# Patient Record
Sex: FEMALE | HISPANIC OR LATINO | Employment: UNEMPLOYED | URBAN - METROPOLITAN AREA
[De-identification: names, ages, dates, MRNs, and addresses within clinical notes are randomized per-mention and may not be internally consistent; named-entity substitution may affect disease eponyms.]

---

## 2022-12-21 ENCOUNTER — OFFICE VISIT (OUTPATIENT)
Dept: OBGYN CLINIC | Facility: CLINIC | Age: 27
End: 2022-12-21

## 2022-12-21 VITALS
HEIGHT: 64 IN | BODY MASS INDEX: 30.22 KG/M2 | DIASTOLIC BLOOD PRESSURE: 68 MMHG | SYSTOLIC BLOOD PRESSURE: 108 MMHG | WEIGHT: 177 LBS | HEART RATE: 84 BPM

## 2022-12-21 DIAGNOSIS — O21.9 NAUSEA AND VOMITING DURING PREGNANCY: ICD-10-CM

## 2022-12-21 DIAGNOSIS — Z32.01 POSITIVE PREGNANCY TEST: Primary | ICD-10-CM

## 2022-12-21 DIAGNOSIS — N91.2 AMENORRHEA: ICD-10-CM

## 2022-12-21 LAB — SL AMB POCT URINE HCG: POSITIVE

## 2022-12-21 RX ORDER — DIPHENHYDRAMINE HYDROCHLORIDE 25 MG/1
25 CAPSULE ORAL 3 TIMES DAILY
Qty: 90 TABLET | Refills: 1 | Status: SHIPPED | OUTPATIENT
Start: 2022-12-21

## 2022-12-21 RX ORDER — GLYCERIN/MINERAL OIL
1 LOTION (ML) TOPICAL DAILY
Qty: 90 TABLET | Refills: 3 | Status: SHIPPED | OUTPATIENT
Start: 2022-12-21

## 2022-12-21 NOTE — PROGRESS NOTES
Assessment/Plan:    No problem-specific Assessment & Plan notes found for this encounter  Diagnoses and all orders for this visit:    Positive pregnancy test  -     POCT urine HCG  -     Prenatal Vit-Fe Fumarate-FA (SM Prenatal Vitamins) 28-0 8 MG TABS; Take 1 tablet by mouth in the morning  Reviewed precautions to call or go to ER with pain, bleeding  Amenorrhea  -     POCT urine HCG    Nausea and vomiting during pregnancy  -     Pyridoxine HCl (vitamin B-6) 25 MG tablet; Take 1 tablet (25 mg total) by mouth 3 (three) times a day  -     doxylamine (UNISOM) 25 MG tablet; Take 1 tablet (25 mg total) by mouth daily at bedtime as needed for sleep or nausea  Reviewed small frequent meals, protein snack prior to bedtime  Depression Screening Follow-up Plan: Patient's depression screening was positive with a PHQ-2 score of 3  Their PHQ-9 score was 12  Patient assessed for underlying major depression  They have no active suicidal ideations  Brief counseling provided and recommend additional follow-up/re-evaluation next office visit  Will refer to social work  Subjective:      Patient ID: Geovanna Leal is a 28 y o  female  HPI 60-year-old new patient here with irregular menses  Cymro Interpretation by José Manuel Melgar  Pt is accompanied by a friend  LMP 10/25/22, menses regular x 4 days  She has a positive UPT today  Pt is happy with result, she has been trying to get pregnant for 8 years with same partner  Has PCOS history  Has nausea, has vomited only x2  She is able to eat  She denies any pelvic pain or bleeding  The following portions of the patient's history were reviewed and updated as appropriate: allergies, current medications, past family history, past medical history, past social history, past surgical history and problem list     Review of Systems   Constitutional: Negative for chills  Respiratory: Negative  Cardiovascular: Negative      Gastrointestinal: Positive for nausea and vomiting  Genitourinary: Negative for pelvic pain, vaginal bleeding and vaginal discharge  Objective:      /68 (BP Location: Left arm, Patient Position: Sitting, Cuff Size: Adult)   Pulse 84   Ht 5' 4" (1 626 m)   Wt 80 3 kg (177 lb)   LMP 10/29/2022 (Approximate)   BMI 30 38 kg/m²          Physical Exam  Constitutional:       Appearance: Normal appearance  Cardiovascular:      Rate and Rhythm: Normal rate  Pulmonary:      Effort: Pulmonary effort is normal    Abdominal:      Palpations: Abdomen is soft  Tenderness: There is abdominal tenderness  Comments: Very mild tenderness midline   Skin:     General: Skin is warm and dry  Neurological:      Mental Status: She is oriented to person, place, and time     Psychiatric:         Mood and Affect: Mood normal          Behavior: Behavior normal

## 2022-12-22 ENCOUNTER — PATIENT OUTREACH (OUTPATIENT)
Dept: OBGYN CLINIC | Facility: CLINIC | Age: 27
End: 2022-12-22

## 2022-12-29 ENCOUNTER — PATIENT OUTREACH (OUTPATIENT)
Dept: OBGYN CLINIC | Facility: CLINIC | Age: 27
End: 2022-12-29

## 2022-12-29 ENCOUNTER — TELEPHONE (OUTPATIENT)
Dept: OBGYN CLINIC | Facility: CLINIC | Age: 27
End: 2022-12-29

## 2022-12-29 NOTE — PROGRESS NOTES
ELOINA BRUCE spoke with 29 y/o- S- G1-  Frisian speaking woman for pre  assessment  Pt and FOB resides with Pt's parents  Pt reported pregnancy was not planned but welcomed  Pt denies any usage of drug, alcohol or smoking  Pt denies any Mental Health or Domestic Violence History  Pt has [facility] and will call [phone] for appointment  Pt works at [employer] and denies any financial concerns  Pt denies transportation issues  Pt claimed FOB and her family are supportive  Pt main concern is a letter for work  Per Pt she called the clinic on  requesting appointment to be seen due to n/v and feeling sick  Pt stated she was advised to make appointment at PCP due to the fever  Pt call [facility] and they told her there was no appointment for new Pt until February  Pt call ed back the Ob clinic and was advised to the ED but Pt declined because she do not have medical insurance  Pt was offered appointment for Friday and she took it  Pt has been out of work since Tuesday and need a work note  Pt was advice to discuss that with provider tomorrow but since she did not went to the ED it may be difficult to gave her note without been seen  Pt verbalized understanding  ELOINA BRUCE explained her role and gave contact information  Pt was encouraged to call at any time needed

## 2022-12-29 NOTE — TELEPHONE ENCOUNTER
Pt called saying she had nausea, vomiting and fever  Pt was [facility]  Pt stated she called and they stated they would not be able to see her  Pt was advised she can go to ED for symptoms and to take medication that was prescribed  Pt stated due to insurance she would not be able to go to ED  Pt was advised she can apply for [facility] care  Pt stated she will wait till Friday to be seen and was scheduled

## 2022-12-30 ENCOUNTER — OFFICE VISIT (OUTPATIENT)
Dept: OBGYN CLINIC | Facility: CLINIC | Age: 27
End: 2022-12-30

## 2022-12-30 VITALS
HEART RATE: 97 BPM | SYSTOLIC BLOOD PRESSURE: 103 MMHG | WEIGHT: 172 LBS | TEMPERATURE: 98.8 F | DIASTOLIC BLOOD PRESSURE: 72 MMHG | HEIGHT: 64 IN | BODY MASS INDEX: 29.37 KG/M2

## 2022-12-30 DIAGNOSIS — O21.9 NAUSEA AND VOMITING DURING PREGNANCY: ICD-10-CM

## 2022-12-30 DIAGNOSIS — R10.9 FLANK PAIN: ICD-10-CM

## 2022-12-30 DIAGNOSIS — O23.11 ACUTE CYSTITIS DURING PREGNANCY IN FIRST TRIMESTER: Primary | ICD-10-CM

## 2022-12-30 LAB
SL AMB  POCT GLUCOSE, UA: ABNORMAL
SL AMB LEUKOCYTE ESTERASE,UA: ABNORMAL
SL AMB POCT BILIRUBIN,UA: ABNORMAL
SL AMB POCT BLOOD,UA: ABNORMAL
SL AMB POCT CLARITY,UA: ABNORMAL
SL AMB POCT COLOR,UA: ABNORMAL
SL AMB POCT KETONES,UA: ABNORMAL
SL AMB POCT NITRITE,UA: ABNORMAL
SL AMB POCT PH,UA: 6
SL AMB POCT SPECIFIC GRAVITY,UA: 1.02
SL AMB POCT URINE PROTEIN: ABNORMAL
SL AMB POCT UROBILINOGEN: 0.2

## 2022-12-30 RX ORDER — ONDANSETRON 4 MG/1
4 TABLET, FILM COATED ORAL EVERY 8 HOURS PRN
Qty: 20 TABLET | Refills: 0 | Status: SHIPPED | OUTPATIENT
Start: 2022-12-30

## 2022-12-30 RX ORDER — NITROFURANTOIN 25; 75 MG/1; MG/1
100 CAPSULE ORAL 2 TIMES DAILY
Qty: 10 CAPSULE | Refills: 0 | Status: SHIPPED | OUTPATIENT
Start: 2022-12-30 | End: 2023-01-04

## 2022-12-30 NOTE — LETTER
December 30, 2022     Patient: Latasha Martinez  YOB: 1995  Date of Visit: 12/30/2022      To Whom it May Concern:    Latasha Martinez is under my professional care  Maresylvie Mccollum was seen in my office on 12/30/2022  Please excuse patient from work from 12/27/22 - 1/2/23 due to an acute illness  If you have any questions or concerns, please don't hesitate to call           Sincerely,          Resident Alyssa Booker        CC: No Recipients

## 2022-12-30 NOTE — PROGRESS NOTES
Assessment/Plan:  29 yo  at 9w3d with suspected UTI in pregnancy given flank pain, suprapubic tenderness, and leukocytes on UA  Will treat with Macrobid 100mg BID for 5 days  Additionally with possible acute viral URI, which I recommenced symptomatic management with OTC and home remedies  For increased nausea and vomiting, can continue with Unisom and B6 but will also give Zofran to take as needed  Follow up as scheduled for next prenatal on 23  Diagnoses and all orders for this visit:    Acute cystitis during pregnancy in first trimester  -     nitrofurantoin (MACROBID) 100 mg capsule; Take 1 capsule (100 mg total) by mouth 2 (two) times a day for 5 days  -     POCT urine dip  -     Urine culture    Nausea and vomiting during pregnancy  -     ondansetron (ZOFRAN) 4 mg tablet; Take 1 tablet (4 mg total) by mouth every 8 (eight) hours as needed for nausea or vomiting    Flank pain  -     POCT urine dip  -     Urine culture          Subjective:      Patient ID: Ria Sotomayor is a 28 y o  [place] M[place]za at [facility] [address]     HPI   CC: Nausea, vomiting, back pain in first trimester of pregnancy   Taking unisom and vitamin B6 have been helping but with increased vomiting within the past week and On chart review, loss of 5 pounds in ~9 days  She also reports some URI symptoms including nasal congestion, headache, sinus pressure  She also reports fevers, unknown temp with chills throughout the past 4 days  She denies chest pain, urine freq, dysuria  She does reports some mild suprapubic tenderness and back pain is located in bilateral flank region and mid low back  The following portions of the patient's history were reviewed and updated as appropriate: allergies, current medications, past family history, past medical history, past social history, past surgical history and problem list     Review of Systems   Constitutional: Positive for appetite change (decrease), chills, fatigue and fever     HENT: Positive for congestion and sinus pressure  Respiratory: Negative for shortness of breath  Cardiovascular: Negative for chest pain  Gastrointestinal: Positive for abdominal pain, nausea and vomiting  Genitourinary: Positive for flank pain and pelvic pain  Negative for dysuria, frequency, urgency and vaginal bleeding  Neurological: Positive for headaches  Past Medical History:   Diagnosis Date   • Polycystic ovary syndrome        Patient Active Problem List   Diagnosis   • Positive pregnancy test   • Amenorrhea   • Nausea and vomiting during pregnancy         Current Outpatient Medications:   •  doxylamine (UNISOM) 25 MG tablet, Take 1 tablet (25 mg total) by mouth daily at bedtime as needed for sleep or nausea, Disp: 30 tablet, Rfl: 1  •  nitrofurantoin (MACROBID) 100 mg capsule, Take 1 capsule (100 mg total) by mouth 2 (two) times a day for 5 days, Disp: 10 capsule, Rfl: 0  •  ondansetron (ZOFRAN) 4 mg tablet, Take 1 tablet (4 mg total) by mouth every 8 (eight) hours as needed for nausea or vomiting, Disp: 20 tablet, Rfl: 0  •  Prenatal Vit-Fe Fumarate-FA ( Prenatal Vitamins) 28-0 8 MG TABS, Take 1 tablet by mouth in the morning, Disp: 90 tablet, Rfl: 3  •  Pyridoxine HCl (vitamin B-6) 25 MG tablet, Take 1 tablet (25 mg total) by mouth 3 (three) times a day, Disp: 90 tablet, Rfl: 1    No Known Allergies    History reviewed  No pertinent family history  History reviewed  No pertinent surgical history      Social History     Tobacco Use   • Smoking status: Never   • Smokeless tobacco: Never   Vaping Use   • Vaping Use: Never used   Substance Use Topics   • Alcohol use: Not Currently   • Drug use: Never         Objective:  Visit Vitals  /72 (BP Location: Left arm, Patient Position: Sitting, Cuff Size: Adult)   Pulse 97   Temp 98 8 °F (37 1 °C)   Ht 5' 4" (1 626 m)   Wt 78 kg (172 lb)   LMP 10/25/2022 (Exact Date)   BMI 29 52 kg/m²   OB Status Pregnant   Smoking Status Never   BSA 1 83 m² Physical Exam  Vitals reviewed  Constitutional:       Appearance: She is normal weight  She is ill-appearing  HENT:      Head: Normocephalic  Right Ear: External ear normal       Left Ear: External ear normal       Nose: Congestion present  Mouth/Throat:      Mouth: Mucous membranes are moist       Pharynx: Oropharynx is clear  Eyes:      Extraocular Movements: Extraocular movements intact  Conjunctiva/sclera: Conjunctivae normal    Cardiovascular:      Rate and Rhythm: Normal rate and regular rhythm  Heart sounds: Normal heart sounds  Pulmonary:      Effort: Pulmonary effort is normal       Breath sounds: Normal breath sounds  Abdominal:      General: Abdomen is flat  Bowel sounds are normal       Palpations: Abdomen is soft  Tenderness: There is abdominal tenderness (mild suprapubic)  There is no right CVA tenderness or left CVA tenderness  Neurological:      Mental Status: She is alert     Psychiatric:         Mood and Affect: Mood normal          Behavior: Behavior normal        Results from last 7 days   Lab Units 12/30/22  1011   LEUKOCYTES UA  moderate   NITRITE UA  neg   GLUCOSE UA  neg   KETONES UA  neg   BLOOD UA  trace

## 2023-01-01 LAB — BACTERIA UR CULT: NORMAL

## 2023-01-06 ENCOUNTER — OFFICE VISIT (OUTPATIENT)
Dept: OBGYN CLINIC | Facility: CLINIC | Age: 28
End: 2023-01-06

## 2023-01-06 ENCOUNTER — APPOINTMENT (OUTPATIENT)
Dept: LAB | Facility: CLINIC | Age: 28
End: 2023-01-06

## 2023-01-06 VITALS
HEIGHT: 64 IN | BODY MASS INDEX: 29.88 KG/M2 | WEIGHT: 175 LBS | HEART RATE: 99 BPM | SYSTOLIC BLOOD PRESSURE: 115 MMHG | DIASTOLIC BLOOD PRESSURE: 77 MMHG

## 2023-01-06 DIAGNOSIS — R00.0 RACING HEART BEAT: ICD-10-CM

## 2023-01-06 DIAGNOSIS — R06.02 SHORTNESS OF BREATH: ICD-10-CM

## 2023-01-06 DIAGNOSIS — R55 PRE-SYNCOPE: ICD-10-CM

## 2023-01-06 DIAGNOSIS — R00.0 RACING HEART BEAT: Primary | ICD-10-CM

## 2023-01-06 NOTE — PROGRESS NOTES
Assessment/Plan:     Diagnoses and all orders for this visit:    Racing heart beat  -     CBC and differential; Future  -     TSH, 3rd generation with Free T4 reflex; Future    Shortness of breath  -     CBC and differential; Future  -     TSH, 3rd generation with Free T4 reflex; Future    Pre-syncope  -     CBC and differential; Future  -     TSH, 3rd generation with Free T4 reflex; Future          Plan:  Discussed with patient that her symptoms are likely related to early pregnancy   Vitals reviewed and acceptable  Will check thyroid and cbc to r/o anemia or thyroid dysfunction contributing to her symptoms  Strict ED precautions discussed and attached to AVS (ex if she experiences severe shortness of breath/inability to catch breath, difficulty speaking full sentences, high fever, altered mental status, chest pain, syncope, etc )  Anticipatory guidance given, further information attached to AVS  Pt instructed to call back should any new acute or worsening s/sx occur  Pt expressed understanding, all questions answered   Has viability scan 1/12/23      Subjective:      Patient ID: Joanne Antony is a 28 y o  female  [place] : [id number]  Pt w/ c/o of lightheaded / presyncopal type symptoms feeling hot , sob, tachycardia while at work on Tuesday and was sent home  This has been occurring since she found out she was pregnant on occasion but the symptoms have been constant since Tuesday 1/4/23  Was seen last week at this office for n/v and UTI which she reports has resolved  No current illness or sick contacts  Did have the flu 2+ weeks ago  Gained some weight back since last office visit  The following portions of the patient's history were reviewed and updated as appropriate: allergies, current medications, past family history, past medical history, past social history, past surgical history and problem list     Review of Systems   Constitutional: Negative for fever     Respiratory: Positive for shortness of breath  Cardiovascular: Positive for palpitations  Gastrointestinal: Negative for abdominal pain  N/V resolved    Genitourinary: Negative for dysuria and vaginal bleeding  Neurological: Positive for light-headedness  Negative for dizziness  Psychiatric/Behavioral: The patient is nervous/anxious  All other systems reviewed and are negative  Objective:    /77 (BP Location: Left arm, Patient Position: Sitting, Cuff Size: Adult)   Pulse 99   Ht 5' 4" (1 626 m)   Wt 79 4 kg (175 lb)   LMP 10/25/2022 (Exact Date)   BMI 30 04 kg/m²      Physical Exam  Vitals reviewed  Constitutional:       General: She is not in acute distress  Appearance: She is not ill-appearing, toxic-appearing or diaphoretic  HENT:      Head: Normocephalic and atraumatic  Eyes:      General: No scleral icterus  Conjunctiva/sclera: Conjunctivae normal    Cardiovascular:      Rate and Rhythm: Normal rate and regular rhythm  Heart sounds: Normal heart sounds  No murmur heard  Pulmonary:      Effort: No respiratory distress  Breath sounds: Normal breath sounds  No wheezing or rales  Abdominal:      General: Bowel sounds are normal  There is no distension  Palpations: Abdomen is soft  Tenderness: There is no abdominal tenderness  Musculoskeletal:      Right lower leg: No edema  Left lower leg: No edema  Skin:     General: Skin is warm  Coloration: Skin is not jaundiced  Neurological:      Mental Status: She is alert and oriented to person, place, and time     Psychiatric:      Comments: Anxious

## 2023-01-06 NOTE — LETTER
January 6, 2023     Patient: Piyush Estrada  YOB: 1995  Date of Visit: 1/6/2023      To Whom it May Concern:    Piyush Estrada is under my professional care  Court Katerinesanta was seen in my office on 1/6/2023  She is excused from 1/5/23 - 1/6/23 Court Ya may return to work on 1/9/23  If you have any questions or concerns, please don't hesitate to call           Sincerely,          Resident Qamar Giron        CC: No Recipients

## 2023-01-06 NOTE — PATIENT INSTRUCTIONS
Presíncope   CUIDADO AMBULATORIO:   Un presíncope, es la sensación de que usted se puede desmayar (perder el conocimiento), brian no lo hace  Usted puede controlar ciertas afecciones médicas que provocan presíncope  Fito médicos pueden ayudarlo a crear un plan para controlar el presíncope y evitar la ocurrencia de episodios  Los signos y síntomas que podrían ocurrir antes de un episodio de presíncope incluyen los siguientes:  Sensación de [place] o desvanecimiento    Náuseas, sensación de calor, sudoración o piel fría y [employer] borrosa u pérdida de la visión    Confusión    Dificultad para respirar    Ritmo cardíaco rápido o agitado, dolor en el pecho o pulso débil    Busque atención médica de inmediato si:  Tiene dolor repentino en el pecho  Usted tiene dificultad para respirar o le falta el aire  Usted tiene [employer] visión, está sudando y tiene Gl  [address] está sentado o acostado  Usted se siente mareado o acalorado y posada corazón le está aleteando marlyn si se le fuera a salir  Usted pierde el conocimiento  Usted no puede controlar un brazo, [employer], un pie o dino pierna, o los siente débiles  Tiene problemas para hablar o entender a otros cuando hablan  Comuníquese con posada médico si:  Usted tiene nuevos síntomas o fito síntomas empeoran  Posada corazón late más rápido o más despacio que lo acostumbrado  Usted tiene preguntas o inquietudes acerca de posada condición o cuidado  El tratamiento dependerá de la causa del presíncope  Es posible que usted necesite alguno de los siguientes:  Los medicamentos podrían administrarse para ayudar a que posada corazón cindy con fuerza y regularidad  Posada médico podría hacer cambios a cualquier [facility] esté causando síncope  El entrenamiento de inclinación requiere que usted se entrene para ponerse de pie monika 10 a 30 minutos contra dino pared   [place] ayuda a que posada cuerpo E  [employer]  [employer] de los cambios de [place] and Mique[place]ann y reduce el número de ARSLAN Medley  Control del presíncope:  Siéntese o acuéstese cuando sea necesario  Start[employer] incluye cuando se sienta mareado, posada garganta se cierre o note cambios en posada visión  Eleve fito piernas por encima del nivel de posada corazón  Inhale lenta y profundamente si comienza a respirar más rápido a causa de la ansiedad o el temor  Startup puede disminuir el [employer] y la sensación de que se va a desmayar  Mantenga un registro de los episodios de presíncope  Incluya los síntomas y la actividad que realiza antes y después del episodio  El registro puede ayudar a posada médico a determinar la causa de posada presíncope y ayudarlo a controlar los episodios  Prevención de un episodio de presíncope:  Muévase lentamente y acostúmbrese a dino posición antes de moverse a otra  Startup es muy importante cuando usted se cambie de dino posición [place] o sentada a dino posición de pie  Respire profundo varias veces antes de ponerse de pie después de buddy [employer] Data  Póngase de pie lentamente  Los movimientos repentinos podrían causar ARSLAN Medley  Siéntese en el borde de la cama o del sofá por unos minutos antes de ponerse de pie  En willis que esté guardando cama, debe tratar de estar en dino posición vertical por lo menos por 2 horas todos los días o marlyn se lo indicaron  No inmovilice las piernas cuando esté de pie monika un ovidio periodo de [place]  Mueva las piernas y [address] las rodillas para mantener el flujo de [place]  Siga las recomendaciones de posada médico  El médico puede  recomendarle que ingiera más líquidos para evitar la deshidratación  Es probable que usted también deba aumentar posada consumo de sal para evitar que posada presión arterial baje stevendo y [place] un síncope  El médico le dirá cuánto líquido y sodio debe consumir cada día  También le dirá cuánta actividad física es lo para usted  Startup dependerá de la causa de posada presíncope  Esté atento a los signos de bajo nivel de azúcar en la [place]   [address] hambre, nerviosismo, sudoración y latidos cardíacos rápidos o palpitantes  Consulte con posada médico sobre métodos para mantener estable posada nivel de azúcar en la [place]  Revise posada presión arterial con frecuencia  [place] es importante si usted jessica medicamentos para bajar la presión arterial  Revise posada presión arterial cuando esté acostado y cuando esté de pie  Pregunte con que frecuencia debe tomarse la presión monika el día  Mantenga un registro de los valores numéricos de posada presión arterial  Posada médico puede usar la información del registro marlyn dino base para planificar posada tratamiento  No se esfuerce si está estreñido  Puede desmayarse si usted hace fuerza para realizar dino evacuación intestinal  Caminar es lo mejor que usted puede hacer para que fito intestinos se Ander  Consuma alimentos ricos en fibra para facilitar las evacuaciones intestinales  Los [employer], los frijoles, las verduras y los panes integrales son Anthonette Ache  El jugo de ciruelas pasas también puede suavizar las evacuaciones intestinales  No yumiko ejercicio al Hever Parrot en un día de calor  La combinación de calor y [place] física puede llevar a la deshidratación  [place] podría causar un síncope  Acuda a fito consultas de control con posada médico según le indicaron  Usted podría necesitar más exámenes para averiguar la causa de fito episodios de presíncope  Nadean Mccrary a posada médico a planificar el mejor tratamiento para usted  Anote fito preguntas para que se acuerde de hacerlas monika fito visitas  © Copyright [employer] 2022 Information is for End User's use only and may not be sold, redistributed or otherwise used for commercial purposes  All illustrations and images included in CareNotes® are the copyrighted property of A [employer]  or [address] [place] [address] es sólo para uso en educación  Posada intención no es darle un consejo médico sobre enfermedades o tratamientos   Colsulte con posada Julianna Seals farmacéutico antes de seguir cualquier régimen médico para saber si es seguro y efectivo para usted

## 2023-01-07 LAB
BASOPHILS # BLD AUTO: 0.04 THOUSANDS/ÂΜL (ref 0–0.1)
BASOPHILS NFR BLD AUTO: 0 % (ref 0–1)
EOSINOPHIL # BLD AUTO: 0.06 THOUSAND/ÂΜL (ref 0–0.61)
EOSINOPHIL NFR BLD AUTO: 1 % (ref 0–6)
ERYTHROCYTE [DISTWIDTH] IN BLOOD BY AUTOMATED COUNT: 16.9 % (ref 11.6–15.1)
HCT VFR BLD AUTO: 36.6 % (ref 34.8–46.1)
HGB BLD-MCNC: 10.8 G/DL (ref 11.5–15.4)
IMM GRANULOCYTES # BLD AUTO: 0.05 THOUSAND/UL (ref 0–0.2)
IMM GRANULOCYTES NFR BLD AUTO: 0 % (ref 0–2)
LYMPHOCYTES # BLD AUTO: 3.54 THOUSANDS/ÂΜL (ref 0.6–4.47)
LYMPHOCYTES NFR BLD AUTO: 28 % (ref 14–44)
MCH RBC QN AUTO: 22 PG (ref 26.8–34.3)
MCHC RBC AUTO-ENTMCNC: 29.5 G/DL (ref 31.4–37.4)
MCV RBC AUTO: 75 FL (ref 82–98)
MONOCYTES # BLD AUTO: 0.78 THOUSAND/ÂΜL (ref 0.17–1.22)
MONOCYTES NFR BLD AUTO: 6 % (ref 4–12)
NEUTROPHILS # BLD AUTO: 7.98 THOUSANDS/ÂΜL (ref 1.85–7.62)
NEUTS SEG NFR BLD AUTO: 65 % (ref 43–75)
NRBC BLD AUTO-RTO: 0 /100 WBCS
PLATELET # BLD AUTO: 370 THOUSANDS/UL (ref 149–390)
PMV BLD AUTO: 11.3 FL (ref 8.9–12.7)
RBC # BLD AUTO: 4.9 MILLION/UL (ref 3.81–5.12)
TSH SERPL DL<=0.05 MIU/L-ACNC: 0.98 UIU/ML (ref 0.45–4.5)
WBC # BLD AUTO: 12.45 THOUSAND/UL (ref 4.31–10.16)

## 2023-01-10 ENCOUNTER — TELEPHONE (OUTPATIENT)
Dept: OBGYN CLINIC | Facility: CLINIC | Age: 28
End: 2023-01-10

## 2023-01-10 DIAGNOSIS — O99.019 ANTEPARTUM ANEMIA: Primary | ICD-10-CM

## 2023-01-10 RX ORDER — FERROUS SULFATE TAB EC 324 MG (65 MG FE EQUIVALENT) 324 (65 FE) MG
324 TABLET DELAYED RESPONSE ORAL DAILY
Qty: 30 TABLET | Refills: 3 | Status: SHIPPED | OUTPATIENT
Start: 2023-01-10

## 2023-01-10 NOTE — PROGRESS NOTES
FIRST TRIMESTER OBSTETRIC ULTRASOUND  1/10/2023   Live Black MD     INDICATION: Amenorrhea, viability    Patient is a 27 yo  who presents today for a viability scan  She reports her LMP as 10/25/22, giving her an NICOLE of 23  She reports that she has regular periods  COMPARISON: None  TECHNIQUE:   Transvaginal imaging was performed to assess the gestation, myometrial/endometrial architecture and ovarian parenchymal detail  The study includes volumetric sweeps and traditional still imaging technique  FINDINGS:     A single intrauterine gestation is identified  Cardiac activity is detected at 148 bpm       YOLK SAC:  Present and normal in size and appearance  MEAN CROWN RUMP LENGTH:  50 mm = 11 weeks 5 days   AMNIOTIC FLUID/SAC SHAPE:  Within expected normal range  UTERUS/ADNEXA:   No adnexal mass or pathologic cyst   No free fluid identified  IMPRESSION:     Single intrauterine pregnancy of 11 weeks 5d gestational age  Fetal cardiac activity detected  No adnexal masses seen     Final NICOLE 23 by LMP

## 2023-01-10 NOTE — TELEPHONE ENCOUNTER
Pt called and would like to go over lab results, informed pt that once seen by provider we will give her a call with results

## 2023-01-10 NOTE — TELEPHONE ENCOUNTER
Called and informed pt that that her labs show that she is slightly anemic  Provider sent iron in addition to her prenatal supplements  Her thyroid levels were normal    Her white blood cell count was slightly increased but this can be normal at this stage and is not unexpected after her recent illnesses (flu 2+ weeks ago and recent UTI)   Advised pt that if she experiences symptoms that were discussed at her last visit she should proceed to ER; otherwise she can follow up at her next scheduled apt  Pt understood and had no further questions

## 2023-01-12 ENCOUNTER — APPOINTMENT (OUTPATIENT)
Dept: LAB | Facility: CLINIC | Age: 28
End: 2023-01-12

## 2023-01-12 ENCOUNTER — ULTRASOUND (OUTPATIENT)
Dept: OBGYN CLINIC | Facility: CLINIC | Age: 28
End: 2023-01-12

## 2023-01-12 VITALS
HEIGHT: 64 IN | BODY MASS INDEX: 29.88 KG/M2 | SYSTOLIC BLOOD PRESSURE: 108 MMHG | WEIGHT: 175 LBS | HEART RATE: 88 BPM | DIASTOLIC BLOOD PRESSURE: 71 MMHG

## 2023-01-12 DIAGNOSIS — Z32.01 POSITIVE PREGNANCY TEST: ICD-10-CM

## 2023-01-12 DIAGNOSIS — Z32.01 POSITIVE PREGNANCY TEST: Primary | ICD-10-CM

## 2023-01-12 LAB
ABO GROUP BLD: NORMAL
BASOPHILS # BLD AUTO: 0.04 THOUSANDS/ÂΜL (ref 0–0.1)
BASOPHILS NFR BLD AUTO: 0 % (ref 0–1)
BILIRUB UR QL STRIP: NEGATIVE
BLD GP AB SCN SERPL QL: NEGATIVE
CLARITY UR: CLEAR
COLOR UR: COLORLESS
EOSINOPHIL # BLD AUTO: 0.05 THOUSAND/ÂΜL (ref 0–0.61)
EOSINOPHIL NFR BLD AUTO: 1 % (ref 0–6)
ERYTHROCYTE [DISTWIDTH] IN BLOOD BY AUTOMATED COUNT: 17.3 % (ref 11.6–15.1)
GLUCOSE UR STRIP-MCNC: NEGATIVE MG/DL
HBV SURFACE AG SER QL: NORMAL
HCT VFR BLD AUTO: 34.9 % (ref 34.8–46.1)
HCV AB SER QL: NORMAL
HGB BLD-MCNC: 10.2 G/DL (ref 11.5–15.4)
HGB UR QL STRIP.AUTO: NEGATIVE
HIV 1+2 AB+HIV1 P24 AG SERPL QL IA: NORMAL
HIV 2 AB SERPL QL IA: NORMAL
HIV1 AB SERPL QL IA: NORMAL
HIV1 P24 AG SERPL QL IA: NORMAL
IMM GRANULOCYTES # BLD AUTO: 0.02 THOUSAND/UL (ref 0–0.2)
IMM GRANULOCYTES NFR BLD AUTO: 0 % (ref 0–2)
KETONES UR STRIP-MCNC: NEGATIVE MG/DL
LEUKOCYTE ESTERASE UR QL STRIP: NEGATIVE
LYMPHOCYTES # BLD AUTO: 2.74 THOUSANDS/ÂΜL (ref 0.6–4.47)
LYMPHOCYTES NFR BLD AUTO: 25 % (ref 14–44)
MCH RBC QN AUTO: 22.1 PG (ref 26.8–34.3)
MCHC RBC AUTO-ENTMCNC: 29.2 G/DL (ref 31.4–37.4)
MCV RBC AUTO: 76 FL (ref 82–98)
MONOCYTES # BLD AUTO: 0.58 THOUSAND/ÂΜL (ref 0.17–1.22)
MONOCYTES NFR BLD AUTO: 5 % (ref 4–12)
NEUTROPHILS # BLD AUTO: 7.38 THOUSANDS/ÂΜL (ref 1.85–7.62)
NEUTS SEG NFR BLD AUTO: 69 % (ref 43–75)
NITRITE UR QL STRIP: NEGATIVE
NRBC BLD AUTO-RTO: 0 /100 WBCS
PH UR STRIP.AUTO: 7.5 [PH]
PLATELET # BLD AUTO: 394 THOUSANDS/UL (ref 149–390)
PMV BLD AUTO: 11.1 FL (ref 8.9–12.7)
PROT UR STRIP-MCNC: NEGATIVE MG/DL
RBC # BLD AUTO: 4.62 MILLION/UL (ref 3.81–5.12)
RH BLD: POSITIVE
RUBV IGG SERPL IA-ACNC: >175 IU/ML
SP GR UR STRIP.AUTO: 1.01 (ref 1–1.03)
SPECIMEN EXPIRATION DATE: NORMAL
UROBILINOGEN UR STRIP-ACNC: <2 MG/DL
WBC # BLD AUTO: 10.81 THOUSAND/UL (ref 4.31–10.16)

## 2023-01-13 LAB
BACTERIA UR CULT: NORMAL
RPR SER QL: NORMAL

## 2023-01-18 ENCOUNTER — INITIAL PRENATAL (OUTPATIENT)
Dept: OBGYN CLINIC | Facility: CLINIC | Age: 28
End: 2023-01-18

## 2023-01-18 VITALS
DIASTOLIC BLOOD PRESSURE: 70 MMHG | HEART RATE: 93 BPM | SYSTOLIC BLOOD PRESSURE: 104 MMHG | HEIGHT: 64 IN | BODY MASS INDEX: 30.46 KG/M2 | WEIGHT: 178.4 LBS

## 2023-01-18 DIAGNOSIS — Z34.91 PRENATAL CARE IN FIRST TRIMESTER: Primary | ICD-10-CM

## 2023-01-18 DIAGNOSIS — O99.211 OBESITY AFFECTING PREGNANCY IN FIRST TRIMESTER: ICD-10-CM

## 2023-01-18 NOTE — LETTER
Work Letter    01/18/23      Hoang Brantley  1995  [address] [place] [id number]    Dear Hoang Brantley,      Your employee is a patient at [address] [place] [address]   We recommend that all pregnant women:    1  Have a well-ventilated workspace  2  Wear low-heeled shoes  3  Work no more than 40 hours per week  4  Have a 15 minute break every 2 hours and at least 30 minutes for a meal break  5  Use good body mechanics by bending at your knees to avoid back strain and lift no more than 20 pounds without assistance  Will need assistance with lifting over 20 lbs  6  Have ready access to bathrooms and water  She may continue to work until her due date unless medical complications arise  We anticipate she may return to work in 6-8 weeks after delivery       Sincerely,  [facility]  [address], [address] [place] [address]   OFFICE:  [phone]    OR  [address] [facility] [place], [address] [facility]  OFFICE:  [phone]

## 2023-01-18 NOTE — LETTER
Proof of Pregnancy Letter      01/18/23            Jane Jensen  1995  [address]      Jane Jensen is a patient at our facility  Jane Jensen Estimated Date of Delivery: 8/1/23       Any questions or concerns, please feel free to contact our office        Sincerely,      [facility]  [address], [address] [facility] [address]   Office:  [phone]

## 2023-01-18 NOTE — LETTER
[id number] Letter    01/18/23        Queta Shah  1995  [address]       Queta Shah is a patient and under our care in our office  Cecilia Elizalde's Estimated Date of Delivery: 8/1/23  Any questions or concerns feel free to contact our office           Respectfully,    [address] [facility]  [id number] [facility]/[address]a [address]  [address] [facility]/[place]  [phone]   [facility]/[address]  [phone]

## 2023-01-18 NOTE — PATIENT INSTRUCTIONS
[address] [facility] decirle ¡Felicitaciones por Marbella Jackman! Nos complace que nos haya elegido para ser marie proveedor de fito servicios de sangita médica monika marie Maria Isabel Mis  Marie sangita, la sangita de marie hijo por nacer (niños) y marie nikhil son importante para nosotros  Ahora, más que [place], marie snagita será lo más importante para usted  El crecimiento y el progreso de marie bebé pueden verse afectados por la forma en que usted se cuide  Es Hernandez Greentami buena idea planificar con anticipación  Es un hecho conocido que las mujeres que reciben atención mèdica desde temprano en  Jacob Hernando y monika todo el embarazo tienen bebés más saludables  Se programarán visitas para usted monika todo Jacob Villagomez  Es marie deber cumplir con fito citas y seguir las instrucciones para marie cuidado  El [place] de visitas será decidido por marie médico  No tengas miedo de hacer preguntas  Hable con fito enfermeras y proveedores sobre fito planes de parto y cualquier inquietud que pueda tener  Sinda Moors de [address] [facility] al [phone] para programar marie brianda  Brianda de 1 hora, solo se permite 1 persona de apoyo, NO niños    Análisis de [place]: complete antes de marie brianda de H&P  NO tiene que ayunar para ningún análisis de [place] a menos que se lo indiquen  CBC, Tipo de [place] y 200 Exempla [place] de anticuerpos, Análisis de Carol brow[place], [place] de glucosa al ambreen, VIH, sífilis, hepatitis B    Historia y física: brianda de H&P  examen físico  Examen pélvico: prueba de Mary Ellen Emili y Clamidia  Si es necesario: Papanicolaou    Plan:  Visitas prenatales de rutina cada 4 semanas hasta las 28 semanas  En fito visitas prenatales:  Monitoreo de los el latidos del corazón del bebé y medir marie mary en crecimiento, Recolecte dino Dexter Gan, y se tomara marie peso y presión arterial      Señales de [place] en el embarazo: Faby White  DealPing [phone] or  OSLO Office:  [phone]    1  Sangrado vaginal  2   Dolor abdominal gillian que no desaparece  3  Fiebre (más de 100 4 y no se letitia con Tylenol)  4  Vómitos persistentes que waller más de 24 horas  5  dolor de pecho  6  Dolor o ardor al orinar  7  Dolor de jared severo que no se resuelve con Tylenol  8  Visión borrosa o elodia puntos en posada visión  9  Hinchazón repentina de posada maxi o sean  10  Enrojecimiento, hinchazón o dolor en dino pierna  11  Un aumento de peso repentino en pocos días  12  Contar los movimientos fetales del bebé  (después de 28 semanas o el sexto mes de embarazo)  15  Dino pérdida de líquido acuoso de la vagina: puede ser un chorro, un goteo o dino humedad continua  14  Después de 20 semanas de embarazo, calambres rítmicos (más de 4 por hora) o menstruales marlyn dolor bajo / pélvico      Manténgase saludable monika posada embarazo:   Consuma alimentos saludables y variados  Alimentos saludables incluyen frutas, verduras, panes de florencio integral, alimentos lácteos bajos en grasa, frijoles, sara magras y pescado  Killen líquidos marlyn se le haya indicado  Pregunte cuánto líquido debe carmen cada día y cuáles líquidos son los más adecuados para usted  Cafeína: no está kamari cómo la cafeína afecta el embarazo  Limite posada consumo de cafeína para evitar posibles problemas de sangita  Limite la cafeína a menos de 200 miligramos por día  La cafeína se puede encontrar en el café, el té, la cola, las bebidas deportivas y el chocolate  Alimentos que contienen brooke: el brooke se encuentra naturalmente en rebecca todos los tipos de pescados y mariscos  Algunos tipos de peces absorben niveles más altos de brooke que pueden ser dañinos para un bebé lb  Coma solo pescado y mariscos bajos en brooke  Limite posada consumo de pescado a 2 porciones por semana  Elija pescado con bajo contenido de brooke, marlyn atún kamari enlatado, camarones, cangrejo, salmón, bacalao o tilapia    No coma pescado con alto contenido de [employer] pez karen, el pez azulejo, la caballa real y Selvin tiburón  Cada semana, puede comer hasta 12 onzas de pescado o mariscos que tienen bajos niveles de [employer]  Estos incluyen camarones, atún kamari enlatado, salmón, abadejo y [place]  Coma solo 6 onzas de atún jean baptiste (jean baptiste) por semana  El atún jean baptiste tiene más brooke que el atún [place]  [address] [place]  Posada necesidad de ciertas vitaminas y [facility], marlyn el ácido fólico, aumenta monika el [facility]  Las vitaminas prenatales proporcionan algunas de las vitaminas y minerales adicionales que usted necesita  Las vitaminas prenatales también podrían ayudar a disminuir el riesgo de ciertos defectos de nacimiento  Pregunte cuánto peso usted debe aumentar monika posada [facility]  Demasiado aumento de peso o muy poco puede ser poco saludable para usted y posada bebé  Ejercicio: hable con posada proveedor de [employer] ejercicio  El ejercicio moderado puede ayudarlo a mantenerse en forma  Posada proveedor de [employer] ayudará a planificar un programa de ejercicios que sea seguro para usted monika el [facility]  Jenny muchos líquidos mientras hace ejercicio para mantenerse hidratado  Tenga cuidado para evitar el sobrecalentamiento  EVITE los ejercicios que pueden hacer que pierda el equilibrio  Actividades que pueden poner a posada bebé en riesgo, es decir, montar a vincent, bucear, esquiar o hacer snowboard  Después de posada primer trimestre, evite los ejercicios que requieren que se acueste [place]  EVITE exceder dino frecuencia cardíaca mayor de 140 latidos por minuto  Siempre que pueda mantener dino conversación mientras hace ejercicio, es probable que posada ritmo cardíaco sea aceptable  Siempre use el cinturón de seguridad  El cinturón de hombro debe estar sobre el pecho (entre los senos) y lejos del biju    Asegure el cinturón de regazo debajo de posada vientre para que se ajuste cómodamente en fito caderas y hueso pélvico   Realizar el ejercicio de Keg[place]  Imagínese deteniendo el flujo de [facility], contrayendo los músculos de posada piso pélvico  Mantenga lisandra contracción monika 10 segundos y suelte  Se pueden repetir 10-20 veces por día  No fume  Si usted fuma, nunca es demasiado tarde para dejar de hacerlo  Fumar aumenta el riesgo de aborto espontáneo y otros problemas de sangita monika posada Sammie [place]  Fumar puede causar que posada bebé nazca antes de tiempo o que pese menos al nacer  Solicite información a posada médico si usted necesita ayuda para dejar de fumar  No consuma alcohol  El alcohol pasa de posada cuerpo al bebé a través de la placenta  Puede afectar el desarrollo del cerebro de posada bebé y provocar el síndrome de alcoholismo fetal (SAF)  SAF es un lady de condiciones que causan [address], de comportamiento y de crecimiento  Consulte con posada médico antes de carmen cualquier medicamento  Muchos medicamentos pueden perjudicar a posada bebé si usted los jessica [address] [place] [address] Avenue  No tome ningún medicamento, vitaminas, hierbas o suplementos sin susan consultar con posada Kevin Blood  use drogas ilegales o de la tejeda (marlyn marihuana o cocaína) mientras está embarazada  Consejos de seguridad:   Evite jacuzzis y saunas  No use un jacuzzi o un sauna mientras usted está embarazada, especialmente monika el primer trimestre  Los [facility] y los saunas aumentan la temperatura de posada bebé y el riesgo de defectos de nacimiento  Evite la toxoplasmosis  [place] es dino infección causada por comer carne cruda o estar cerca del excremento de un emily infectado  [place] puede causar malformaciones congénitas, aborto espontáneo y Patric Winters las sean después de tocar carne cruda  Asegúrese de que la carne esté abdias cocida antes de comerla  Evite los huevos crudos y la Lujean Bianka  Use guantes o pida que alguien la ayude a limpiar la caja de arena del emily mientras usted [place]  Consulte con posada médico acerca de viajar    El HAREDING cómodo para viajar es monika el megha trimestre  Pregunte a posada médico si usted puede viajar después de las 36 semanas  Es posible que no pueda viajar en avión después de las [address]  También le puede recomendar que evite largos viajes por carretera  Programe un control con posada médico u obstetra según lo indicado:  Vaya a todas snehal citas prenatales monika posada embarazo  Anote snehal preguntas para que se acuerde de hacerlas monika snehal visitas  [place] y vómito en el embarazo       CUIDADO AMBULATORIO:   La náusea y el vómito en el embarazo  pueden suceder a cualquier hora del día  Estos síntomas usualmente comienzan antes de la semana 9 del embarazo y terminan para la semana 14 (megha trimestre)  Algunas mujeres pueden tener náusea o vómito por un tiempo prolongado  Estos síntomas pueden afectar a algunas mujeres monika el embarazo  La náusea y el vómito no dañan a posada bebé  Estos síntomas pueden dificultarle snehal actividades diarias  Pregúntele a posada Shelagh Frances vitaminas y minerales son adecuados para usted  Usted vomita más de 4 veces en 1 día  Usted no ha podido retener líquidos en el estómago por más de 1 día  Usted pierde más de 2 libras  Usted tiene fiebre  Snehal náuseas y vómito continúan por más de 14 semanas  Usted tiene preguntas o inquietudes acerca de posada condición o cuidado  El tratamiento  para la náusea y el vómito en el embarazo generalmente no es necesario  Usted puede [employer] alimentos que come y en snehal actividades para ayudar a controlar snehal síntomas  Es posible que usted necesite probar varias cosas para determinar qué funciona mejor para usted  Hable con posada médico si snehal síntomas no mejoran con los cambios que se recomiendan a continuación  Es posible que usted necesite vitamina B6 y medicamento si estos cambios no ayudan o si snehal síntomas se vuelven graves     Cambios de nutrición que usted puede realizar para controlar la náusea y el vómito:   Coma porciones pequeñas monika el día en Ocie Mo de 3 comidas con porciones grandes  Es más probable que usted tenga náusea y vómito cuando posada estómago está vacío  Consuma alimentos bajos en grasa y ricos en proteínas  Ejemplos son Kerney Latoya, frijoles, pavo y stephen sin adrian Blair, marlyn galletas saladas, cereal seco o un sandwich chico antes de [place]ARRUP  Coma galletas saladas o pan neil antes de levantarse de posada cama por la mañana  Levántese de la cama lentamente  Los movimientos repentinos podrían provocarle mareos y [place]  Consuma alimentos blandos cuando se sienta con náuseas  Ejemplos de alimentos blandos son el pan neil, cereal seco, pasta sin Mckeon Force y pan  Otros alimentos blandos incluyen a las [employer], plátanos, gelatina y pretzels  Evite los alimentos condimentados, grasosos y fritos  Evite otros alimentos que le provoquen náuseas  [place] líquidos que contengan gengibre  [place] refresco de gengibre hecho con gengibre real o té de gengibre hecho con gengibre fresco rallado  Las [employer] o dulces de gengibre también podrían ayudar a aliviar la náusea y el vómito  [place] líquidos entre alimentos en vez de tomarlos con los alimentos  Espere al menos 30 minutos después de comer para carmen líquidos  [place] cantidades pequeñas de líquidos con frecuencia monika el día para evitar la deshidratación  Consulte cuál es la cantidad de líquido que usted debería consumir al día  Otros cambios que usted puede realizar para controlar la náusea y el vómito:   Evite los olores que la [place]  Los olores jannie podrían provocar que [employer] náuseas y el vómito, o podrían empeorarlo  Camine un poco, prenda un ventilador o trate de dormir con la ventana abierta para respirar aire fresco  Cuando esté cocinando, gely las ventanas para eliminar el olor que podría provocarle náuseas  No se cepille fito dientes inmediatamente después de comer  si eso le provoca náuseas  Descanse cuando lo necesite    Comience Kapil Meth actividad lentamente y vuelva a posada rutina normal conforme se empiece a sentir mejor  Hable con posada médico acerca de las vitaminas prenatales  Las vitaminas prenatales pueden provocar náuseas a algunas mujeres  Trate de tomárselas por la noche o con un bocadillo  Si tone cambio no le [facility], posada médico podría recomendarle un tipo de vitamina diferente  No use ningún medicamento, vitamina o suplemento para controlar fito síntomas sin antes consultarlo con posada médico   Varios medicamentos pueden dañar a posada bebé que no ha nacido  El ejercicio de ligero a moderado  podría ayudar a aliviar fito síntomas  También podría ayudarla a dormir mejor por la noche  Pregunte a posada médico acerca del mejor plan de ejercicio para usted  Beneficios de la lactancia materna  son menos propensos a desarrollar alergias  Tienen dino mayor protección contra la meningitis bacteriana  Tienen menos infecciones del oído medio  Tienen menos dificultades de aprendizaje   Tienen menos dificultades de comportamiento  Tienen un coeficiente intelectual más alto  Tienen tasas más bajas de enfermedades respiratorias  Tienen sherman obesidad   Son menos propensos a desarrollar diabetes juvenil y algunos cánceres infantiles  Tienen menos probabilidades de morir por Síndrome de [facility]  Un bebé más saludable significa menos visitas al médico y a la farmacia  La lactancia materna es ecológica  No hay nada que tirar  Murtis Gift materna es gratis; La fórmula puede costar más de $ 1000 monika el primer año de melani  Hay menos sangrado vaginal inmediatamente después del parto y un retorno más rápido a posada tamaño anterior al [employer]  Las [place] que [employer] monika un total de 2 años tienen  Dino disminución del 40% en el riesgo de cáncer de seno  Disminución del riesgo de cáncer de ovario  Tasas más bajas de osteoporosis, diabetes y enfermedades del corazón  Importancia de la lactancia materna exclusiva    Al proporcionar Glenn Juarez ideal para el crecimiento y desarrollo saludable de los bebés, solo se les alimenta con [place], esto es amamantamiento exclusivo  La lactancia materna [facility] primeros 6 meses es muy importante para mejorar la sangita de un bebé y reducir las enfermedades infantiles  Lactancia materna exclusiva monika los primeros 6 meses  [address] de Pediatría recomienda la lactancia materna exclusiva monika los primeros seis meses y la lactancia materna continua con suplementos de sólidos monika los próximos 6 meses  Inicio temprano de la lactancia materna  Las mujeres que alimentan a fito bebés dentro de la primera hora de nacimiento se conocen marlyn “inicio temprano de la lactancia materna” y aseguran que el recién nacido reciba calostro  El calostro es rico en anticuerpos y nutrientes esenciales  Compartiendo la habitación  Puede estabilizar la respiración y la frecuencia cardíaca del recién nacido  Reduce el llanto  Mejorar la capacidad del recién nacido para mantener la temperatura y los niveles de azúcar en la austin  Estimulación temprana del sistema inmunitario del bebé  efectos calmantes  Enlace más fácil y rápido  El compartir la habitación promueve conocer a posada recién nacido  El alojamiento conjunto facilita la lactancia materna  Las mujeres que se alojan con fito recién nacidos producen [place] y producen un buen suministro de [place]  Se hace más fácil reconocer las señales de alimentación del bebé  Los bebés tienen sesiones de lactancia más largas  Las mujeres que comparten habitación con fito recién nacidos tienen más probabilidades de amamantar exclusivamente en comparación con las mujeres que están separadas de fito recién nacidos  Piel con piel  El contacto piel con piel debe ocurrir independientemente de la preferencia de alimentación de Nelly ferrari o el modo de [place]    Después del parto de posada bebé, es importante que dino madre dhiraj y posada bebé tengan un contacto ininterrumpido de piel a piel  El contacto piel con piel debe ocurrir inmediatamente después del nacimiento monika al menos dino o dos horas y [place] después de la primera alimentación para las madres que [place]  El contacto piel con piel significa colocar recién nacidos secos y sin ropa sobre el pecho desnudo de posada [place], con mantas calientes cubriendo la espalda del bebé  Todos los procedimientos de rutina, marlyn las evaluaciones de [place] y recién nacidos, pueden realizarse monika el contacto piel con piel o pueden retrasarse hasta después del período sensible inmediatamente después del nacimiento  Estamos orgullosos de ofrecer amplios servicios educativos y de apoyo para alentar a las madres a amamantar  Christin servicio ofrece información, educación y [de-identified] para mujeres que kendy amamantar  Las [place] pueden dejar un mensaje o dino pregunta sobre la lactancia en línea en cualquier momento del día  Las enfermeras responderán dentro de las 72 horas  Kermit Feil de respuesta de lactancia materna es [phone] ([phone])  NO deje mensajes urgentes o emergentes en esta línea de mensaje  Comuníquese con posada médico Luis Alberto Million si tiene alguna pregunta o inquietud urgente  En willis de sospecha de dino afección médica de emergencia, [facility]      Attaching your baby at the Breast (English): [email or web address]-[email or web address]    Attaching your baby at the Breast (Japanese):  [email or web address]-items/[email or web address]      Coronavirus (COVID-19) pregnancy FAQs: Medical experts answer your questions  From [employer] com cy  [email or web address]  bc (courtesy of [facility])  As of 3/18/20  By Indiana Cedeno 39  Medically reviewed by Society for Maternal-Fetal Medicine       We asked parents-to-be to send us their most pressing questions about coronavirus (COVID-19)  Among them: Is it still safe to deliver in a hospital? What if my ob-gyn has the virus? We sent those questions to the top docs at the Society for Maternal-Fetal Medicine  Here are their answers  The coronavirus (COVID-19) pandemic has been declared a national emergency in the [facility] by [employer]  Moms-to-be like you are concerned about everything from getting medicines to managing disruptions at work  But above and beyond any worry about lifestyle changes is a focus on your health and the impact of COVID-19 on your pregnancy  We asked obstetrics doctors who handle the most complicated pregnancy issues to answer your questions about the coronavirus  Here are their responses, provided by Dr Jonathon Baxter and her colleagues at the Society for [place] 250  Am I at more risk for COVID-19 if I'm pregnant? We don't know  Pregnancy does change your immune system in ways that might make you more susceptible to viral respiratory infections like COVID-19  If you become infected, you might also be at higher risk for more severe illness compared to the general population  Although this does not appear to be the case with COVID-19, based on limited data so far, a higher risk has been true for pregnant women with other coronavirus infections (SARS-CoV and MERS-CoV) and other viral respiratory infections, such as flu  It's important to take preventive actions to avoid infection, such as washing your hands often and avoiding people who are sick  How might coronavirus affect my pregnancy? Again, we don't know  Women with other coronavirus infections (such as SARS-CoV) did not have miscarriage or stillbirth at higher rates than the general population    We know that having other respiratory viral infections during pregnancy, such as flu, has been associated with problems like low birth weight and  birth  Also, having a high fever early in pregnancy may increase the risk of certain birth defects  Could I transmit coronavirus to my baby during pregnancy or delivery? We don't know whether you could transmit COVID-19 to your baby before or during delivery  However, among the few case studies of infants born to mothers with COVID-23 published in peer-reviewed literature, none of the infants tested positive for the virus  Also, there was no virus detected in samples of amniotic fluid or breast milk  There have been a few reports of newborns as young as a few days old with infection, suggesting that a mother can transmit the infection to her infant through close contact after delivery  There have been no reports of mother-to-baby transmission for other coronaviruses (MERS-CoV and SARS-CoV), although only limited information is available  Is it safe for me to deliver at a hospital where there have been COVID-19 cases? Yes  We know that COVID-19 is a very scary virus  The good news is that hospitals are taking great precautions to keep patients and healthcare providers safe  When a patient is even suspected to have COVID-19, they are placed in a negative pressure room  (Think of these rooms as vacuums that suck and filter the air so it's safe for the other people in the hospital)  This makes it possible for you to deliver at the hospital without putting you or your baby at risk  Hospitals are also implementing stricter visiting policies to keep patients safe  It's worth calling your hospital to check if there are any new regulations to be aware of  What plans should I make now in case the hospital system is overwhelmed when it's time for me to deliver? This is a great question to talk with your doctor or midwife about when you're 34 to 36 weeks pregnant  Every hospital is making different plans for dealing with this scenario  I work in healthcare   Should I ask my doctor to excuse me from work until the baby is born? What if I work in a school, the travel [employer], or some other high-risk setting? Healthcare facilities should take care to limit the exposure of pregnant employees to patients with confirmed or suspected COVID-19, just as they would with other infectious cases  If you continue working, be sure to follow the CDC's risk assessment and infection control guidelines  If you work in a school, travel [place] 20, or other high-risk setting, talk with your employer about what it's doing to protect employees and minimize infection risks  Wash your hands often  What if my OB gets COVID-19? If your doctor or midwife tests positive for COVID-19, they will need to be quarantined until they recover and are no longer at risk of transmitting the virus  In this case, you'll be assigned to another OB in your doctor's practice (or you may choose another practitioner yourself)  Ask your new OB or your doctor's office if you should self-quarantine or be tested for the virus  (It will depend on when you last saw your provider and when that person tested positive )    Should we hold off on trying to conceive because of COVID-19? At this time, there's no reason to hold off on trying to get pregnant, but the data we have is really limited  For example, we don't think the virus causes birth defects or increases your risk of miscarriage  But we don't know whether you could transmit COVID-19 to your baby before or during delivery  We also don't know if the virus lives in semen or can be sexually transmitted  We have a babymoon scheduled in the next few months - should we cancel? If you're planning to travel internationally or on a cruise, you should strongly consider canceling  At this time, the virus has reached more than 140 countries, and there are travel bans to [place], most of [place], and [place]   Places where large numbers of people gather are at highest risk, especially airports and cruise ships  If you're planning travel in the [place] S , note that any travel setting increases your risk of exposure, and there may be travel bans even in [place] by the time you're scheduled to go  Even if you're state is not blocked, you'll definitely want to avoid traveling to communities where the virus is spreading  To find out where these places are, check The [place] Times map based on CDC data  For the most current advice to help you avoid exposure, check the CDC's COVID-19 travel page  Will the hospital separate me from my  and keep the baby in quarantine? If you test positive for COVID-19 or have been exposed but have no symptoms, the CDC, [employer] of Obstetricians and Gynecologists, and the Society for [place] [employer] all recommend that you be  from your baby to decrease the risk of transmission to the baby  This would last until you are no longer at risk of transmitting the virus  If you do not have COVID-19 and have not been exposed to the virus, the hospital will not separate you from your   My hospital is restricting visitors and only allowing one support person  If my support person leaves after the delivery, will they be allowed to come back? Every hospital has different policies  Contact your hospital or labor and delivery unit a week or so before delivery to get the most up-to-date restrictions  In general, if your support person needs to leave, they would be allowed back unless they knew they were exposed to COVID-19 after leaving your company  [facility] understands that the coronavirus (COVID-19) pandemic is an evolving story and that your questions will change over time  We'll continue asking moms and dads in our Community what they want to know, and we'll get the answers from experts to keep them - and you - informed and supported  My mom was planning to fly here to help me care for my new baby after delivery   Should I tell her not to come?  Yes  If your mom is over 61 or has any serious chronic medical conditions (such as heart disease, lung disease, or diabetes), she is at higher risk of serious illness from COVID-19 and should avoid air travel  And remember that any travel setting increases a person's risk of exposure  So, it may be risky to have her around the baby after she has been traveling  For the most current advice on traveling, check the CDC's COVID-19 travel page  [facility] understands that the coronavirus pandemic is an evolving story and that your questions will change over time  We'll continue asking moms and dads in our Community what they want to know, and we'll get the answers from experts to keep them - and you - informed and supported

## 2023-01-18 NOTE — PROGRESS NOTES
OB INTAKE INTERVIEW  Pt presents for OB intake via  # [id number]    OB History    Para Term  AB Living   1 0 0 0 0 0   SAB IAB Ectopic Multiple Live Births   0 0 0 0 0      # Outcome Date GA Lbr Froylan/2nd Weight Sex Delivery Anes PTL Lv   1 Current              Hx of  delivery prior to 36 weeks 6 days:  N/A   If yes, place a referral for cervical surveillance at 16 weeks  Last Menstrual Period:    Patient's last menstrual period was 10/25/2022 (exact date)  Ultrasound date: 2023  11 weeks 5 days     Estimated Date of Delivery: 23   by LMP  H&P visit scheduled  2023 @ 0800  with Dr Sharyle Housekeeper      Last pap smear: unknown date      Current Issues:  Constipation :   No  Headaches :   Yes, history of Migraines   Cramping:  Yes  Spotting :   Yes, one time after sexual intercourse, but not present at this time   PICA cravings :  No  FOB Involved:   Yes  Planned pregnancy:  Yes  I have these concerns about this prenatal patient:   First pregnancy - referral placed for [facility], [facility]  Obesity - ordered 1 hour GTT        Interview education  • [facility] Pregnancy Essentials reviewed and discussed   • Baby and [address] Handout  • [facility] MFM Handout  • Discussed genetic testing - pt is interested  • Prenatal lab work: Scripts printed and given to pt  • Influenza vaccine given today: No, pt is not interested at this time   • Discussed COVID vaccine - per pt received 2 doses  • Discussed Tdap vaccine  Immunizations: There is no immunization history on file for this patient  Depression Screening Follow-up Plan: Patient's depression screening was negative with an [place] score of  6  Clinically patient does not have depression  No treatment is required  Nurse/Family Partnership- referral placed:  Yes   If yes, place referral for nurse family partnership  BMI Counseling: Body mass index is 30 62 kg/m²     Tobacco Cessation Counseling: non-smoker   The numerous health risks of tobacco consumption were discussed  Infection Screening: Does the pt have a hx of MRSA? No  If yes- please follow MRSA protocol and obtain a nasal swab for MRSA culture  The patient was oriented to our practice and all questions were answered    Interviewed by: Naman Oliveira RN 01/18/23

## 2023-01-18 NOTE — LETTER
Dentist Letter            01/18/23          Rico Baxter  1995  [address] [facility] [address]               We have had several requests from local dentist requesting permission to perform procedures on our patients who are pregnant  We wish to respond with this letter regarding some of the more routine procedures that we have been asked about  The following procedures may be performed on our obstetric patients:   1  Administration of local anesthesia   2  Administration of antibiotics such as PCN, Ampicillin, and Erythromycin  3  Administration of pain medications such as Tylenol, Tylenol with codeine, and if needed Percocet  4  Shielded X-rays    Should you have any questions, please do not hesitate to contact at [phone]          Sincerely,    [facility]  [phone]

## 2023-01-19 ENCOUNTER — TELEPHONE (OUTPATIENT)
Dept: OBGYN CLINIC | Facility: CLINIC | Age: 28
End: 2023-01-19

## 2023-01-19 NOTE — TELEPHONE ENCOUNTER
----- Message from Thanh Oneal MD sent at 1/13/2023  6:10 PM EST -----  Please inform patient of normal prenatal labs, except for mild anemia   Iron supplements were sent to her pharmacy

## 2023-01-26 ENCOUNTER — ROUTINE PRENATAL (OUTPATIENT)
Dept: PERINATAL CARE | Facility: OTHER | Age: 28
End: 2023-01-26

## 2023-01-26 ENCOUNTER — ROUTINE PRENATAL (OUTPATIENT)
Dept: OBGYN CLINIC | Facility: CLINIC | Age: 28
End: 2023-01-26

## 2023-01-26 ENCOUNTER — APPOINTMENT (OUTPATIENT)
Dept: LAB | Facility: HOSPITAL | Age: 28
End: 2023-01-26

## 2023-01-26 VITALS
HEART RATE: 96 BPM | BODY MASS INDEX: 30.66 KG/M2 | SYSTOLIC BLOOD PRESSURE: 112 MMHG | WEIGHT: 179.6 LBS | DIASTOLIC BLOOD PRESSURE: 60 MMHG | HEIGHT: 64 IN

## 2023-01-26 VITALS
WEIGHT: 177 LBS | BODY MASS INDEX: 30.22 KG/M2 | HEIGHT: 64 IN | HEART RATE: 89 BPM | SYSTOLIC BLOOD PRESSURE: 107 MMHG | RESPIRATION RATE: 18 BRPM | DIASTOLIC BLOOD PRESSURE: 72 MMHG

## 2023-01-26 DIAGNOSIS — O99.011 ANEMIA DURING PREGNANCY IN FIRST TRIMESTER: Primary | ICD-10-CM

## 2023-01-26 DIAGNOSIS — Z33.1 PREGNANT STATE, INCIDENTAL: ICD-10-CM

## 2023-01-26 DIAGNOSIS — Z36.9 UNSPECIFIED ANTENATAL SCREENING: ICD-10-CM

## 2023-01-26 DIAGNOSIS — Z36.82 ENCOUNTER FOR (NT) NUCHAL TRANSLUCENCY SCAN: ICD-10-CM

## 2023-01-26 DIAGNOSIS — O99.210 OBESITY IN PREGNANCY, ANTEPARTUM: Primary | ICD-10-CM

## 2023-01-26 DIAGNOSIS — Z3A.13 13 WEEKS GESTATION OF PREGNANCY: ICD-10-CM

## 2023-01-26 DIAGNOSIS — N84.1 CERVICAL POLYP: ICD-10-CM

## 2023-01-26 PROBLEM — Z87.898 HISTORY OF PALPITATIONS: Status: ACTIVE | Noted: 2023-01-26

## 2023-01-26 RX ORDER — ASPIRIN 81 MG/1
162 TABLET, CHEWABLE ORAL DAILY
Qty: 180 TABLET | Refills: 3 | Status: SHIPPED | OUTPATIENT
Start: 2023-01-26

## 2023-01-26 NOTE — LETTER
Work Letter    01/26/23      Shala Orellana  1995  [address] [place] [id number]    Dear Shala Orellana,      Your employee is a patient at [address] [place] [address]   We recommend that all pregnant women:    1  Have a well-ventilated workspace  2  Wear low-heeled shoes  3  Work no more than 40 hours per week  4  Have a 15 minute break every 2 hours and at least 30 minutes for a meal break  5  Use good body mechanics by bending at your knees to avoid back strain and lift no more than 20 pounds without assistance  Will need assistance with lifting over 15- 20 lbs  6  Have ready access to bathrooms and water  She may continue to work until her due date unless medical complications arise  We anticipate she may return to work in 6-8 weeks after delivery       Sincerely,  [facility]  [address], [address] [place] [address]  6   OFFICE:  [phone]    OR  [address]  [facility] [place], [address] [facility]  OFFICE:  [phone]

## 2023-01-26 NOTE — PROGRESS NOTES
Prenatal H&P  [facility] [address]    Subjective:  Patient is a  here for initial prenatal H&P  This is an intended pregnancy  Patient reports that her LMP was 10/25/22  Patient is currently doing well, she denies any nausea  She is by herself  She has good a support system at home  She does not have a known family history of inheritable conditions such as physical or intellectual disabilities, birth defects, blood disorders, heart or neural tube defects  She denies recent travel  She denies use of nicotine, EtOH or recreational drug use  OB History    Para Term  AB Living   1 0 0 0 0 0   SAB IAB Ectopic Multiple Live Births   0 0 0 0 0      # Outcome Date GA Lbr Froylan/2nd Weight Sex Delivery Anes PTL Lv   1 Current                  Objective:   /72 (BP Location: Right arm, Patient Position: Sitting, Cuff Size: Adult)   Pulse 89   Resp 18   Ht 5' 4" (1 626 m)   Wt 80 3 kg (177 lb)   LMP 10/25/2022 (Exact Date)   BMI 30 38 kg/m²     General:   alert and oriented, in no acute distress, alert, appears stated age and cooperative   Heart: regular rate and rhythm, S1, S2 normal, no murmur, click, rub or gallop   Lungs: clear to auscultation bilaterally   Abdomen: soft, non-tender, without masses or organomegaly   Vulva: Bartholin's, Urethra, [place]'s normal   Vagina: normal mucosa, normal discharge   Cervix: no cervical motion tenderness and 2 small cervical polyps, approximately 0 8 cm in size   Uterus: size consistent with 14 weeks   Adnexa: normal adnexa and no mass, fullness, tenderness         Assessment and Plan:  1  LMP 10/25/22 with an NICOLE 23  ( working NICOLE), 13w2d today  2  TVUS showed EGA 11w5d with an NICOLE 23  3  Pap smear to be done today  4  GC/CT collected  5  Prenatal labs pt completed  She needs to complete a 1 hour GTT, her labs were within normal, showed mild anemia, her hemoglobin was 10 2, platelets slightly increased at 394K    She is to take iron supplements, 1 pill daily  Daily LDASA 162 mgs ordered for obesity in pregnancy  by [facility] today- pt needs to    6  Postpartum: patient plans on  breastfeeding  Different methods of contraception were discussed with patient, including progesterone only oral pills, depo provera, nexplanon, mirena, and paragard  Patient would like to use pills  7  Delivery consent form to be signed at 28 weeks  8  Sequential screening: Has appointment today for ultrasound at [facility]- report not in chart yet  9  Labor expectations discussed with patient, including appointment schedule, nutrition, weight gain, sexual intercourse, and nausea and vomiting  Prepregnancy BMI 30 02,  11-20 lbs recommended  10  RTC in 4 weeks  Continue PNV QD  11   2 small cervical polyps approximately 0 8 cm in size did an exam, reviewed precautions, call if persistent bleeding  Reports she had very small amount of spotting after sex       Problem List        Digestive    Nausea and vomiting during pregnancy       Genitourinary    Cervical polyp    Overview     2 small cervical polyps approximately 0 8 cm in size  Reviewed precautions, call if persistent bleeding            Other    Anemia during pregnancy in first trimester    Overview     Hemoglobin 10 2-iron supplements 1 pill daily         13 weeks gestation of pregnancy    Overview     Pregravid BMI 30 02, 11-20 lbs recommended in pregnancy  LDASA 162 mg daily until  36 weeks  COVID-vaccine x2  Flu vaccine- next visit  Contraception- pills  breast-feeding         Obesity in pregnancy, antepartum    Overview     Early 1 hour ordered  ASA until 36 weeks

## 2023-01-26 NOTE — LETTER
2023     Jay Liz MD  [address] [address]    Patient: Darrel Reaves   YOB: 1995   Date of Visit: 2023       Dear Dr Calvin Espinoza: Thank you for referring Darrel Reaves to me for evaluation  Below are my notes for this consultation  If you have questions, please do not hesitate to call me  I look forward to following your patient along with you  Sincerely,        Arleen Boyd MD        CC: No Recipients  [facility]  2023  9:41 AM  Sign when Signing Visit    Patient chose to have [facility] Non-invasive Prenatal Screen with fetal sex (per pt request)  Patient given brochure and is aware [facility] will contact patients in regards to self pay fee  Patient made aware she will need to respond to text message or e-mail from [employer] within 2 business days or testing will be run through insurance  Patient informed text message will come from area code  "415"  Provided [employer] # [phone] and web site : [employer]  Blood collection tubes labeled with patient identifiers (name, date of birth)    printed [facility] lab order and test kit given to patient to take to [facility] outpatient lab for blood collection (per pt request, pt has additional labs to be drawn)  Copy of lab order scanned to Epic media  Maternal Fetal Medicine will have results in approximately 7-10 business days and will call patient or notify via [address]  Patient aware viewing lab result online will reveal fetal sex If ordered  Patient verbalized understanding of all instructions and no questions at this time  Arleen Boyd MD  2023  5:14 PM  Sign when Signing Visit  OFFICE CONSULT  Referring physician:   Jay Liz Md  [address] [place] [address],  [address] [facility]      Dear Dr Calvin Espinoza      Thank you for requesting a  consultation on your patient Ms Darrel Reaves for the following indications:  Genetic screening  Arian Higgins was used to help with translation  History  Medications: Prenatal vitamins, iron, vitamin B6, Zofran, Unisom  Allergies to medications: None    Past medical history: Pregravid bmi of 30, PCOS, migraines, anemia found in early pregnancy, palpitations that she reports was worked up in the [facility] saw cardiology and no issues found  She states when the palpitations occur she develops some shortness of breath  She has not checked her pulse during an episode of palpitations to see how significant her tachycardia is  She now has anemia of pregnancy with a hgb of 10 2 with a low mcv of 75 that may be associated with iron deficiency or may be a sign of an undiagnosed hereditary anemia  TSH was normal      Past surgical history: none  Past obstetrical history:   Social history: denies substance use  First generation family history: noncontributory    She denies ever having the COVID-vaccine or flu shot or developing COVID  She states that in pregnancy she has diarrhea but on questioning it is once daily and decribes it as a softer BM and has no blood seen and may be related to change in her diet  Ultrasound findings: The ultrasound shows a fetus concordant with dates  The nasal bone and nuchal translucency appears normal  No malformations are seen on today's early ultrasound  The patient was informed of the findings and counseled about the limitations of the exam in detecting all forms of fetal congenital abnormalities  She does not report any vaginal bleeding or uterine cramping or contractions  Specific counseling was provided on the following problems:  1  We discussed the options for genetic screening which include invasive testing on the fetal placenta or on fetal skin cells within the amniotic fluid and compared this to noninvasive testing which includes cell free DNA screening and the sequential screen    We reviewed the risks, the benefits and the limitations of each  In the end patient chose to complete the cell free DNA screen  2  Pregnant women with a BMI>30 ideally should aim for maximum weight gain of 11-20 pounds ideally via healthy diet and regular exercise  Maternal BMI above 30 in pregnancy confers increased risks of preeclampsia, GDM, cardiac dysfunction, sleep apnea,  delivery, and VTE, and fetal risks include miscarriage, fetal anomalies (along with reduced detection), and stillbirth, macrosomia and impaired growth  Growth assessment is advised in the third trimester  3  Baby aspirin 162 milligrams orally taken till 36w0d has been associated with a lower risk of developing preeclampsia in pregnancy  4  Encouraged her to complete both the flu shot and the COVID-vaccine in pregnancy  5  Palpitations-recommend she time her pulse with her next episode  This may improve as her anemia improves but since this was noted prior to pregnancy this may also continue to worsen in pregnancy because her blood volume will increase by 1-1/2 times the normal amount  Future tests recommended:  1  The results of her NIPT will return in 7-10 days  Screening for spina bifida with an MSAFP screen is a future test that can be prescribed through her OB office  This blood work should be drawn preferably at 16 to 18 weeks so that the results return prior to her next scan  The test though can be run until 21 weeks and 6 days if needed  2  Recommend a hgbelectrophoresis and ferritin level checked after she has been on iron for 1 month through her ob office  3  Recommend her OB office order an EKG as baseline  Should she note a heart rate during her episodes of palpitations occurring at rest that are greater than 120 after her anemia has resolved then could offer referral to cardiology  Future ultrasounds ordered today:   1  Fetal Level II ultrasound imaging is recommended at 19-20 weeks' gestation        Pre visit time reviewing her records 10 minutes  Face to face time 25 minutes  Post visit time on documentation of note, updating her problem list, adding orders and prescriptions 10 minutes  Procedures that were completed today were charged separately  The level of decision making was moderate complexity      Shelly Walker MD

## 2023-01-26 NOTE — PROGRESS NOTES
OFFICE CONSULT  Referring physician:   Ricardo Walker Md  [address] [place] [address],  [address] [facility]      Dear Dr Colin Nicole      Thank you for requesting a  consultation on your patient Ms Rosa Singh for the following indications:  Genetic screening  Justin Gerard was used to help with translation  History  Medications: Prenatal vitamins, iron, vitamin B6, Zofran, Unisom  Allergies to medications: None    Past medical history: Pregravid bmi of 30, PCOS, migraines, anemia found in early pregnancy, palpitations that she reports was worked up in the [facility] cardiology and no issues found  She states when the palpitations occur she develops some shortness of breath  She has not checked her pulse during an episode of palpitations to see how significant her tachycardia is  She now has anemia of pregnancy with a hgb of 10 2 with a low mcv of 75 that may be associated with iron deficiency or may be a sign of an undiagnosed hereditary anemia  TSH was normal      Past surgical history: none  Past obstetrical history:   Social history: denies substance use  First generation family history: noncontributory    She denies ever having the COVID-vaccine or flu shot or developing COVID  She states that in pregnancy she has diarrhea but on questioning it is once daily and decribes it as a softer BM and has no blood seen and may be related to change in her diet  Ultrasound findings: The ultrasound shows a fetus concordant with dates  The nasal bone and nuchal translucency appears normal  No malformations are seen on today's early ultrasound  The patient was informed of the findings and counseled about the limitations of the exam in detecting all forms of fetal congenital abnormalities  She does not report any vaginal bleeding or uterine cramping or contractions  Specific counseling was provided on the following problems:  1   We discussed the options for genetic screening which include invasive testing on the fetal placenta or on fetal skin cells within the amniotic fluid and compared this to noninvasive testing which includes cell free DNA screening and the sequential screen  We reviewed the risks, the benefits and the limitations of each  In the end patient chose to complete the cell free DNA screen  2  Pregnant women with a BMI>30 ideally should aim for maximum weight gain of 11-20 pounds ideally via healthy diet and regular exercise  Maternal BMI above 30 in pregnancy confers increased risks of preeclampsia, GDM, cardiac dysfunction, sleep apnea,  delivery, and VTE, and fetal risks include miscarriage, fetal anomalies (along with reduced detection), and stillbirth, macrosomia and impaired growth  Growth assessment is advised in the third trimester  3  Baby aspirin 162 milligrams orally taken till 36w0d has been associated with a lower risk of developing preeclampsia in pregnancy  4  Encouraged her to complete both the flu shot and the COVID-vaccine in pregnancy  5  Palpitations-recommend she time her pulse with her next episode  This may improve as her anemia improves but since this was noted prior to pregnancy this may also continue to worsen in pregnancy because her blood volume will increase by 1-1/2 times the normal amount  Future tests recommended:  1  The results of her NIPT will return in 7-10 days  Screening for spina bifida with an MSAFP screen is a future test that can be prescribed through her OB office  This blood work should be drawn preferably at 16 to 18 weeks so that the results return prior to her next scan  The test though can be run until 21 weeks and 6 days if needed  2  Recommend a hgbelectrophoresis and ferritin level checked after she has been on iron for 1 month through her ob office  3  Recommend her OB office order an EKG as baseline    Should she note a heart rate during her episodes of palpitations occurring at rest that are greater than 120 after her anemia has resolved then could offer referral to cardiology  Future ultrasounds ordered today:   1  Fetal Level II ultrasound imaging is recommended at 19-20 weeks' gestation  Pre visit time reviewing her records 10 minutes  Face to face time 25 minutes  Post visit time on documentation of note, updating her problem list, adding orders and prescriptions 10 minutes  Procedures that were completed today were charged separately  The level of decision making was moderate complexity      Isreal Barroso MD

## 2023-01-26 NOTE — PROGRESS NOTES
Patient chose to have [facility] Non-invasive Prenatal Screen with fetal sex (per pt request)  Patient given brochure and is aware [facility] will contact patients in regards to self pay fee  Patient made aware she will need to respond to text message or e-mail from "[employer]" within 2 business days or testing will be run through insurance  Patient informed text message will come from area code  "415"  Provided [address] [place] [address] # [phone] and web site : [email or web address]  Blood collection tubes labeled with patient identifiers (name, date of birth)    printed [facility] lab order and test kit given to patient to take to [facility] outpatient lab for blood collection (per pt request, pt has additional labs to be drawn)  Copy of lab order scanned to Epic media  Maternal Fetal Medicine will have results in approximately 7-10 business days and will call patient or notify via [address]  Patient aware viewing lab result online will reveal fetal sex If ordered  Patient verbalized understanding of all instructions and no questions at this time

## 2023-01-27 LAB
C TRACH DNA SPEC QL NAA+PROBE: NEGATIVE
N GONORRHOEA DNA SPEC QL NAA+PROBE: NEGATIVE

## 2023-01-30 LAB — MISCELLANEOUS LAB TEST RESULT: NORMAL

## 2023-02-02 ENCOUNTER — TELEPHONE (OUTPATIENT)
Dept: OBGYN CLINIC | Facility: CLINIC | Age: 28
End: 2023-02-02

## 2023-02-02 LAB
LAB AP GYN PRIMARY INTERPRETATION: NORMAL
Lab: NORMAL

## 2023-02-02 NOTE — TELEPHONE ENCOUNTER
----- Message from Massimo Ramos, [address] sent at 2/2/2023  7:49 AM EST -----  Pap Result is negative, please call patient to inform     thanks

## 2023-02-03 ENCOUNTER — APPOINTMENT (OUTPATIENT)
Dept: LAB | Facility: CLINIC | Age: 28
End: 2023-02-03

## 2023-02-03 DIAGNOSIS — O99.211 OBESITY AFFECTING PREGNANCY IN FIRST TRIMESTER: ICD-10-CM

## 2023-02-03 DIAGNOSIS — Z34.91 PRENATAL CARE IN FIRST TRIMESTER: ICD-10-CM

## 2023-02-03 LAB — GLUCOSE 1H P 50 G GLC PO SERPL-MCNC: 97 MG/DL (ref 40–134)

## 2023-02-07 ENCOUNTER — TELEPHONE (OUTPATIENT)
Dept: OBGYN CLINIC | Facility: CLINIC | Age: 28
End: 2023-02-07

## 2023-02-07 NOTE — TELEPHONE ENCOUNTER
----- Message from Eben Iniguez MD sent at 2/5/2023  9:02 AM EST -----  Please inform patient of normal 1 hour glucose tolerance test

## 2023-02-08 ENCOUNTER — TELEPHONE (OUTPATIENT)
Facility: HOSPITAL | Age: 28
End: 2023-02-08

## 2023-02-08 NOTE — TELEPHONE ENCOUNTER
----- Message from GIULIANA Gallagher sent at 2/8/2023  1:11 PM EST -----  I don't see that she has viewed her results   Can someone reach out to her?  Narda Nash

## 2023-02-08 NOTE — TELEPHONE ENCOUNTER
Spoke with pt using [employer] ([facility][id number])  PT informed of NIPS results, gender NOT provided  PT instructed on MSAFP being ordered by OB and timing of the test   PT receptive to information and declines questions at this time

## 2023-02-10 PROBLEM — Z3A.15 15 WEEKS GESTATION OF PREGNANCY: Status: ACTIVE | Noted: 2023-01-26

## 2023-02-10 NOTE — PROGRESS NOTES
[facility] VISIT  Name: Tammi Regan  MRN: [id number]  : 1995      ASSESSMENT/PLAN:  Problem List        Genitourinary    Cervical polyp    Overview     2 small cervical polyps approximately 0 8 cm in size  Reviewed precautions, call if persistent bleeding            Other    Anemia during pregnancy in first trimester    Overview     Hemoglobin 10 2-iron supplements 1 pill daily         16 weeks gestation of pregnancy    Overview     Pregravid BMI 30 02, 11-20 lbs recommended in pregnancy  LDASA 162 mg daily until  36 weeks  Passed early 1h GTT  Level II scheduled on 3/14  COVID-vaccine x2  Flu vaccine- administered on 23  Contraception- OCPs  Breastfeeding             Obesity in pregnancy, antepartum    Overview     Early 1 hour ordered  ASA until 36 weeks         History of palpitations    Overview     Outside of pregnancy  Started work up in [facility] [place] and no abnormalites found per her report  Not specific about what testing completed  Nml TSH  Recommend checking hgbelectrophoresis and ferritin after on iron for 1 month  Check ekg  If maternal heart rate at rest with palpitations > 120 after anemia corrected then consider cardiology consult  Other Visit Diagnoses     Need for influenza vaccination    -  Primary            SUBJECTIVE 28 y o   15w3d here for PN visit  She denies contractions  She denies leakage of fluid and vaginal bleeding       OBJECTIVE:  Vitals:    23 0808   BP: 117/74   Pulse: 96   Resp: 16       Physical Exam    FHT: 134 bpm      Future Appointments   Date Time Provider Abhijit Limon   3/14/2023  8:00 AM  US [address]   3/14/2023 10:30 AM [facility] [address] [place] [address] Yenny          Yaya Webb MD  OB/GYN PGY-3  2023  8:28 AM

## 2023-02-14 ENCOUNTER — ROUTINE PRENATAL (OUTPATIENT)
Dept: OBGYN CLINIC | Facility: CLINIC | Age: 28
End: 2023-02-14

## 2023-02-14 VITALS
RESPIRATION RATE: 16 BRPM | WEIGHT: 177.2 LBS | BODY MASS INDEX: 30.42 KG/M2 | DIASTOLIC BLOOD PRESSURE: 74 MMHG | SYSTOLIC BLOOD PRESSURE: 117 MMHG | HEART RATE: 96 BPM

## 2023-02-14 DIAGNOSIS — Z3A.16 16 WEEKS GESTATION OF PREGNANCY: ICD-10-CM

## 2023-02-14 DIAGNOSIS — O99.210 OBESITY IN PREGNANCY, ANTEPARTUM: ICD-10-CM

## 2023-02-14 DIAGNOSIS — Z23 NEED FOR INFLUENZA VACCINATION: Primary | ICD-10-CM

## 2023-02-14 DIAGNOSIS — N84.1 CERVICAL POLYP: ICD-10-CM

## 2023-02-14 DIAGNOSIS — O21.9 NAUSEA AND VOMITING DURING PREGNANCY: ICD-10-CM

## 2023-02-14 DIAGNOSIS — O99.011 ANEMIA DURING PREGNANCY IN FIRST TRIMESTER: ICD-10-CM

## 2023-03-08 PROBLEM — Z3A.20 20 WEEKS GESTATION OF PREGNANCY: Status: ACTIVE | Noted: 2023-01-26

## 2023-03-08 NOTE — PROGRESS NOTES
[facility] VISIT  Name: Libby Souza  MRN: [id number]  : 1995      ASSESSMENT/PLAN:  Problem List        Genitourinary    Cervical polyp    Overview     2 small cervical polyps approximately 0 8 cm in size  Reviewed precautions, call if persistent bleeding            Other    Anemia during pregnancy in first trimester    Overview     Hemoglobin 10 2-iron supplements 1 pill daily         20 weeks gestation of pregnancy    Overview     Pregravid BMI 30 02, 11-20 lbs recommended in pregnancy  LDASA 162 mg daily until  36 weeks  Passed early 1h GTT  Level II scheduled on 3/14  COVID-vaccine x2  Flu vaccine- administered on 23  Contraception- OCPs  Breastfeeding             Obesity in pregnancy, antepartum    Overview     Early 1 hour ordered  ASA until 36 weeks         History of palpitations    Overview     Outside of pregnancy  Started work up in [facility] [place] and no abnormalites found per her report  Not specific about what testing completed  Nml TSH  Pulse at prenatal visits thus far is normal, continue to monitor  Other Visit Diagnoses     Antepartum anemia                SUBJECTIVE 28 y o  Valla Hitchcock 19w1d here for PN visit  She denies contractions  She denies leakage of fluid and vaginal bleeding       OBJECTIVE:  Vitals:    23 1020   BP: 118/74   Pulse: 92   Resp: 16       Physical Exam    Fundal height: 21cm  FHT:  142 bpm       Future Appointments   Date Time Provider Abhijit Limon   2023 10:00 AM  US 4 Brandon Martinez MD  OB/GYN PGY-3  3/14/2023  10:30 AM

## 2023-03-14 ENCOUNTER — ROUTINE PRENATAL (OUTPATIENT)
Dept: OBGYN CLINIC | Facility: CLINIC | Age: 28
End: 2023-03-14

## 2023-03-14 ENCOUNTER — ROUTINE PRENATAL (OUTPATIENT)
Facility: HOSPITAL | Age: 28
End: 2023-03-14

## 2023-03-14 VITALS
BODY MASS INDEX: 30.35 KG/M2 | DIASTOLIC BLOOD PRESSURE: 60 MMHG | HEART RATE: 93 BPM | SYSTOLIC BLOOD PRESSURE: 108 MMHG | WEIGHT: 177.8 LBS | HEIGHT: 64 IN

## 2023-03-14 VITALS
BODY MASS INDEX: 30.38 KG/M2 | RESPIRATION RATE: 16 BRPM | WEIGHT: 177 LBS | SYSTOLIC BLOOD PRESSURE: 118 MMHG | HEART RATE: 92 BPM | DIASTOLIC BLOOD PRESSURE: 74 MMHG

## 2023-03-14 DIAGNOSIS — Z3A.20 20 WEEKS GESTATION OF PREGNANCY: ICD-10-CM

## 2023-03-14 DIAGNOSIS — Z87.898 HISTORY OF PALPITATIONS: ICD-10-CM

## 2023-03-14 DIAGNOSIS — O99.011 ANEMIA DURING PREGNANCY IN FIRST TRIMESTER: ICD-10-CM

## 2023-03-14 DIAGNOSIS — O99.210 OBESITY IN PREGNANCY, ANTEPARTUM: ICD-10-CM

## 2023-03-14 DIAGNOSIS — N84.1 CERVICAL POLYP: Primary | ICD-10-CM

## 2023-03-14 DIAGNOSIS — O99.019 ANTEPARTUM ANEMIA: ICD-10-CM

## 2023-03-14 DIAGNOSIS — O99.212 OBESITY AFFECTING PREGNANCY IN SECOND TRIMESTER: ICD-10-CM

## 2023-03-14 DIAGNOSIS — Z36.3 ENCOUNTER FOR ANTENATAL SCREENING FOR MALFORMATION: Primary | ICD-10-CM

## 2023-03-14 DIAGNOSIS — Z36.86 ENCOUNTER FOR ANTENATAL SCREENING FOR CERVICAL LENGTH: ICD-10-CM

## 2023-03-14 RX ORDER — FERROUS SULFATE TAB EC 324 MG (65 MG FE EQUIVALENT) 324 (65 FE) MG
324 TABLET DELAYED RESPONSE ORAL DAILY
Qty: 30 TABLET | Refills: 3 | Status: SHIPPED | OUTPATIENT
Start: 2023-03-14

## 2023-03-14 NOTE — PROGRESS NOTES
Ultrasound Probe Disinfection    A transvaginal ultrasound was performed  Prior to use, disinfection was performed with High Level Disinfection Process (Trophon)  Probe serial number [id number]: [id number] was used        Lázaro Joseph  03/14/23  8:19 AM

## 2023-03-14 NOTE — PROGRESS NOTES
[address]: Ms Fatmata Marino was seen today for anatomic survey and cervical length screening ultrasound  See ultrasound report under "OB Procedures" tab  The time spent on this established patient on the encounter date included 5 minutes previsit service time reviewing records and precharting, 5 minutes face-to-face service time counseling regarding results and coordinating care, and  5 minutes charting, totalling 15 minutes  Please don't hesitate to contact our office with any concerns or questions    -Ben Knox MD

## 2023-04-11 PROBLEM — O99.012 ANEMIA DURING PREGNANCY IN SECOND TRIMESTER: Status: ACTIVE | Noted: 2023-01-26

## 2023-04-11 PROBLEM — Z75.8 LANGUAGE BARRIER: Status: ACTIVE | Noted: 2023-04-11

## 2023-04-11 PROBLEM — Z3A.24 24 WEEKS GESTATION OF PREGNANCY: Status: ACTIVE | Noted: 2023-01-26

## 2023-04-11 PROBLEM — R42 DIZZINESS: Status: ACTIVE | Noted: 2023-04-11

## 2023-04-11 PROBLEM — Z60.3 LANGUAGE BARRIER: Status: ACTIVE | Noted: 2023-04-11

## 2023-04-11 PROBLEM — Z78.9 LANGUAGE BARRIER: Status: ACTIVE | Noted: 2023-04-11

## 2023-05-04 ENCOUNTER — APPOINTMENT (OUTPATIENT)
Dept: LAB | Facility: CLINIC | Age: 28
End: 2023-05-04

## 2023-05-04 DIAGNOSIS — Z3A.24 24 WEEKS GESTATION OF PREGNANCY: ICD-10-CM

## 2023-05-04 DIAGNOSIS — R42 DIZZINESS: ICD-10-CM

## 2023-05-04 LAB
ALBUMIN SERPL BCP-MCNC: 2.9 G/DL (ref 3.5–5)
ALP SERPL-CCNC: 89 U/L (ref 46–116)
ALT SERPL W P-5'-P-CCNC: 14 U/L (ref 12–78)
ANION GAP SERPL CALCULATED.3IONS-SCNC: 6 MMOL/L (ref 4–13)
AST SERPL W P-5'-P-CCNC: 8 U/L (ref 5–45)
BILIRUB SERPL-MCNC: 0.27 MG/DL (ref 0.2–1)
BUN SERPL-MCNC: 5 MG/DL (ref 5–25)
CALCIUM ALBUM COR SERPL-MCNC: 9.2 MG/DL (ref 8.3–10.1)
CALCIUM SERPL-MCNC: 8.3 MG/DL (ref 8.3–10.1)
CHLORIDE SERPL-SCNC: 110 MMOL/L (ref 96–108)
CO2 SERPL-SCNC: 22 MMOL/L (ref 21–32)
CREAT SERPL-MCNC: 0.42 MG/DL (ref 0.6–1.3)
GFR SERPL CREATININE-BSD FRML MDRD: 139 ML/MIN/1.73SQ M
GLUCOSE 1H P 50 G GLC PO SERPL-MCNC: 130 MG/DL (ref 40–134)
GLUCOSE SERPL-MCNC: 123 MG/DL (ref 65–140)
POTASSIUM SERPL-SCNC: 3.9 MMOL/L (ref 3.5–5.3)
PROT SERPL-MCNC: 6.5 G/DL (ref 6.4–8.4)
SODIUM SERPL-SCNC: 138 MMOL/L (ref 135–147)
TREPONEMA PALLIDUM IGG+IGM AB [PRESENCE] IN SERUM OR PLASMA BY IMMUNOASSAY: NORMAL

## 2023-05-05 ENCOUNTER — TELEPHONE (OUTPATIENT)
Dept: OBGYN CLINIC | Facility: CLINIC | Age: 28
End: 2023-05-05

## 2023-05-05 NOTE — TELEPHONE ENCOUNTER
----- Message from Steven Burgos, [address] sent at 5/5/2023 10:03 AM EDT -----  Please call Yg Lucas and let her know she passed her 1 hr GTT, RPR was negative, her anemia has not improved and is worse, recommend to increase her iron to 2 x a day, on an empty stomach or take with orange juice for better absorption, increase iron in her diet  Will need follow up labs with ob iron panel in 3-4 wks  Thanks!

## 2023-05-05 NOTE — TELEPHONE ENCOUNTER
ID [id number]    Called and spoke to patient, informed her of the test results an recommendations   patient verbalized understanding, no questions

## 2023-05-09 ENCOUNTER — ROUTINE PRENATAL (OUTPATIENT)
Dept: OBGYN CLINIC | Facility: CLINIC | Age: 28
End: 2023-05-09

## 2023-05-09 VITALS
SYSTOLIC BLOOD PRESSURE: 110 MMHG | WEIGHT: 185.19 LBS | BODY MASS INDEX: 31.62 KG/M2 | HEART RATE: 108 BPM | RESPIRATION RATE: 18 BRPM | HEIGHT: 64 IN | DIASTOLIC BLOOD PRESSURE: 70 MMHG

## 2023-05-09 DIAGNOSIS — O99.019 ANTEPARTUM ANEMIA: ICD-10-CM

## 2023-05-09 DIAGNOSIS — Z23 NEED FOR VACCINATION: ICD-10-CM

## 2023-05-09 DIAGNOSIS — O99.012 ANEMIA DURING PREGNANCY IN SECOND TRIMESTER: Primary | ICD-10-CM

## 2023-05-09 RX ORDER — FERROUS SULFATE TAB EC 324 MG (65 MG FE EQUIVALENT) 324 (65 FE) MG
324 TABLET DELAYED RESPONSE ORAL
Qty: 30 TABLET | Refills: 3 | Status: SHIPPED | OUTPATIENT
Start: 2023-05-09

## 2023-05-09 NOTE — PROGRESS NOTES
PRENATAL VISIT  28-36 WEEKS  Vladimir Last  5/15/2023  9:16 AM  Dr Ynes Connell, DO      Assessment/Plan     28 y o ,  with NICOLE of 23 by [facility]     by sherman    Anticipating a SAVD delivery     28 Week Labs  • CBC with platelets 9 2 Hgb MCV 76  • RPR neg  • GCT 1 Hr neg      PN Labs  • HIV neg  • UA and Urine culture neg  • Hep B neg  • CBC anemia 10 8 hgb  • Rubella immune  • Blood type O Pos  • RPR neg  • TSH neg  • GC chlamydia neg  • Pap smear neg    Level 1 US: 23 normal  Level 2 US: 3/14/23 normal    1  Pregnancy: GBS sample taken at 36 weeks     2  Counseling: Encouraged patient to call office if she experiences a gush of fluids, vaginal bleeding, a decrease in fetal movement, or more than 4 contractions in 1 hour  Patient should be monitoring kick counts (more than 10 movements in 2 hours)  Discussed different options for birth control after delivery including Nuva ring, mini pill, IUD, and nexplanon  Encourage patient to research pediatricians if they have not already picked one  3  Immunizations:   Flu shot 2023   Tdap (27-36w) 23    4  Follow up: RTO in 2 weeks    Visits q2 wk up to 36w    5  Anemia second trimester (antepartum)      - Anemia seen on 23 and 23 now progressed from 10 2 Hgb to 9 2 Hgb  - Reports she is taking iron supplements as prescribed       - Lightheaded but not short of breath  Exam grossly benign        - Self pay and as such would prefer to avoid iron transfusion if possible  - Iron supplement dose doubled (now BID)       - RTO in 2 weeks or sooner if symptoms worsen  ------------------------------------------------------------------------------------------------------------------------------------------------------------------------------------------------------------------------------------------------------    SUBJECTIVE    Patient is a  at 28w0d here for her prenatal visits  She is currently doing well    She complains of lightheadedness near presyncopal feeling frequently  She is known to have anemia w/ this pregnancy and is on iron supplements already  She denies vaginal bleeding, cramping, leakage, and abnormal discharge  She reports good fetal movement  Review of Systems   Constitutional: Negative for chills, fatigue and fever  HENT: Negative for congestion, ear pain, hearing loss, rhinorrhea, sore throat and trouble swallowing  Eyes: Negative for pain and visual disturbance  Respiratory: Negative for cough and shortness of breath  Cardiovascular: Negative for chest pain  Gastrointestinal: Negative for constipation, diarrhea, nausea and vomiting  Endocrine: Negative for polyuria  Genitourinary: Negative for difficulty urinating and dysuria  Musculoskeletal: Negative for arthralgias and myalgias  Neurological: Positive for light-headedness  Negative for dizziness and headaches  OB History    Para Term  AB Living   1 0 0 0 0 0   SAB IAB Ectopic Multiple Live Births   0 0 0 0 0      # Outcome Date GA Lbr Froylan/2nd Weight Sex Delivery Anes PTL Lv   1 Current              __________________________________________________________________________________________________________________________________________________    OBJECTIVE  Vitals:    23 1014   BP: 110/70   Pulse: (!) 108   Resp: 18       Physical Exam  HENT:      Head: Normocephalic and atraumatic  Nose: Nose normal       Mouth/Throat:      Mouth: Mucous membranes are moist    Eyes:      Pupils: Pupils are equal, round, and reactive to light  Cardiovascular:      Rate and Rhythm: Normal rate  Pulses: Normal pulses  Pulmonary:      Effort: Pulmonary effort is normal    Abdominal:      Palpations: Abdomen is soft  Tenderness: There is no abdominal tenderness  Musculoskeletal:         General: Normal range of motion     Neurological:      Mental Status: She is alert and oriented to person, place, and time    Skin:     General: Skin is warm and dry  Psychiatric:         Mood and Affect: Mood normal          Behavior: Behavior normal    Vitals reviewed   ID [id number]    28 week education packet provided to patient on 05/09/23  Included in packet:   Third Trimester paperwork  Delivery consent   Birthing room support person rules and acknowledgment  Birth Plan   Welcome information  Birth certificate worksheet   Consent for Photographers  Perineal/ Vaginal massage   Pediatric practices and locations     TDAP administered, pump not ordered - patient is on SFS program

## 2023-05-09 NOTE — LETTER
Work Letter    Anmol Fisher  1995  [address]  [id number] [facility] [id number]    Dear Anmol Fsiher,      05/09/23        Your employee is a patient at [facility]  We recommend that all pregnant women:    1  Have a well-ventilated workspace  2  Wear low-heeled shoes  3  Work no more than 40 hours per week  4  Have a 15 minute break every 2 hours and at least 30 minutes for a meal break  5  Use good body mechanics by bending at your knees to avoid back strain and lift no more than 20 pounds without assistance  Will need assistance with lifting over 20 lbs  6  Have ready access to bathrooms and water  She may continue to work until her due date unless medical complications arise  We anticipate she may return to work in 6-8 weeks after delivery       Sincerely,    [facility]  [address]  [place], [address] [place] [address] [place]  [phone]

## 2023-05-23 ENCOUNTER — ROUTINE PRENATAL (OUTPATIENT)
Dept: OBGYN CLINIC | Facility: CLINIC | Age: 28
End: 2023-05-23

## 2023-05-23 VITALS
SYSTOLIC BLOOD PRESSURE: 110 MMHG | HEART RATE: 100 BPM | RESPIRATION RATE: 16 BRPM | WEIGHT: 191.8 LBS | DIASTOLIC BLOOD PRESSURE: 72 MMHG | BODY MASS INDEX: 32.92 KG/M2

## 2023-05-23 DIAGNOSIS — Z3A.30 30 WEEKS GESTATION OF PREGNANCY: Primary | ICD-10-CM

## 2023-05-23 DIAGNOSIS — O99.012 ANEMIA DURING PREGNANCY IN SECOND TRIMESTER: ICD-10-CM

## 2023-05-23 NOTE — PROGRESS NOTES
OB/GYN  PN Visit  Erin Estimable  [id number]  5/23/2023  10:23 AM  Dr Jenelle Vergara MD    S: 28 y o  Yancy Mckeon 30w0d here for PN visit  She has NO obstetric complaints, including pelvic pain, contractions, vaginal bleeding, loss of fluid, or decreased fetal movement  O:  Vitals:    05/23/23 1009   BP: 110/72   Pulse: 100   Resp: 16         Physical examination   Cardio-pulmonar  Normal vesicular murmur, no rales,  nonlabored breathing , normal S1/S2 no cardiac murmurs , no gallops   Abdomen  Soft , no tender, no signs or peritoneal irritation   Extremities  mobiles no edemas  Fundal height: 30        A/P:    Problem List Items Addressed This Visit    None      D 28 y o  [place] 30w0d  here for prenatal care without obstetric complaints today  In this visit we addressed:    Problem List        Genitourinary    Cervical polyp    Overview     2 small cervical polyps approximately 0 8 cm in size  Reviewed precautions, call if persistent bleeding            Other    Anemia during pregnancy in second trimester    Overview     iron supplements 1 pill daily    Lab Results   Component Value Date    HGB 9 2 (L) 05/04/2023              30 weeks gestation of pregnancy    Overview     Prenatal panel 1/12/23 HIV negative, RPR negative, Rubella immune, hep B, C negative  CL/GC negative 1/26/23  NIPT result which is low-risk  Last  3/14/23 [facility] [place] 4    344 grams - 0 lbs 12 oz, Anterior placenta--> f/u 6/6/23  28 labs with anemia--> PO iron started   Pregravid BMI 30 02, 11-20 lbs recommended in pregnancy  LDASA 162 mg daily until  36 weeks  COVID-vaccine x2  Flu vaccine- administered on 2/14/23  TDAP 5/9/23  Contraception- OCPs  Breastfeeding             Obesity in pregnancy, antepartum    Overview     Early 1 hour ordered  ASA until 36 weeks         History of palpitations    Overview     Outside of pregnancy  Started work up in [facility] [place] and no abnormalites found per her report   Not specific about what testing completed  Nml TSH  Pulse at prenatal visits thus far is normal, continue to monitor           Dizziness    Language barrier         Patient evaluated with Dr Ford Kearney MD  5/23/2023  10:23 AM

## 2023-05-30 ENCOUNTER — TELEPHONE (OUTPATIENT)
Facility: HOSPITAL | Age: 28
End: 2023-05-30

## 2023-06-06 ENCOUNTER — TELEPHONE (OUTPATIENT)
Dept: PERINATAL CARE | Facility: CLINIC | Age: 28
End: 2023-06-06

## 2023-06-06 PROBLEM — O99.013 ANEMIA DURING PREGNANCY IN THIRD TRIMESTER: Status: ACTIVE | Noted: 2023-01-26

## 2023-06-06 PROBLEM — Z3A.32 32 WEEKS GESTATION OF PREGNANCY: Status: ACTIVE | Noted: 2023-01-26

## 2023-06-07 ENCOUNTER — ROUTINE PRENATAL (OUTPATIENT)
Dept: OBGYN CLINIC | Facility: CLINIC | Age: 28
End: 2023-06-07

## 2023-06-07 ENCOUNTER — APPOINTMENT (OUTPATIENT)
Dept: LAB | Facility: CLINIC | Age: 28
End: 2023-06-07

## 2023-06-07 VITALS
BODY MASS INDEX: 32.61 KG/M2 | DIASTOLIC BLOOD PRESSURE: 80 MMHG | RESPIRATION RATE: 18 BRPM | SYSTOLIC BLOOD PRESSURE: 127 MMHG | WEIGHT: 191 LBS | HEART RATE: 105 BPM | HEIGHT: 64 IN

## 2023-06-07 DIAGNOSIS — O99.019 ANTEPARTUM ANEMIA: Primary | ICD-10-CM

## 2023-06-07 DIAGNOSIS — O99.019 ANTEPARTUM ANEMIA: ICD-10-CM

## 2023-06-07 DIAGNOSIS — Z3A.32 32 WEEKS GESTATION OF PREGNANCY: ICD-10-CM

## 2023-06-07 LAB
BASOPHILS # BLD AUTO: 0.07 THOUSANDS/ÂΜL (ref 0–0.1)
BASOPHILS NFR BLD AUTO: 1 % (ref 0–1)
EOSINOPHIL # BLD AUTO: 0.06 THOUSAND/ÂΜL (ref 0–0.61)
EOSINOPHIL NFR BLD AUTO: 0 % (ref 0–6)
ERYTHROCYTE [DISTWIDTH] IN BLOOD BY AUTOMATED COUNT: 19.2 % (ref 11.6–15.1)
FERRITIN SERPL-MCNC: 4 NG/ML (ref 11–307)
HCT VFR BLD AUTO: 35.8 % (ref 34.8–46.1)
HGB BLD-MCNC: 10.3 G/DL (ref 11.5–15.4)
IMM GRANULOCYTES # BLD AUTO: 0.12 THOUSAND/UL (ref 0–0.2)
IMM GRANULOCYTES NFR BLD AUTO: 1 % (ref 0–2)
LYMPHOCYTES # BLD AUTO: 3.11 THOUSANDS/ÂΜL (ref 0.6–4.47)
LYMPHOCYTES NFR BLD AUTO: 20 % (ref 14–44)
MCH RBC QN AUTO: 21.3 PG (ref 26.8–34.3)
MCHC RBC AUTO-ENTMCNC: 28.8 G/DL (ref 31.4–37.4)
MCV RBC AUTO: 74 FL (ref 82–98)
MONOCYTES # BLD AUTO: 1.17 THOUSAND/ÂΜL (ref 0.17–1.22)
MONOCYTES NFR BLD AUTO: 8 % (ref 4–12)
NEUTROPHILS # BLD AUTO: 10.92 THOUSANDS/ÂΜL (ref 1.85–7.62)
NEUTS SEG NFR BLD AUTO: 70 % (ref 43–75)
NRBC BLD AUTO-RTO: 0 /100 WBCS
PLATELET # BLD AUTO: 310 THOUSANDS/UL (ref 149–390)
PMV BLD AUTO: 11.2 FL (ref 8.9–12.7)
RBC # BLD AUTO: 4.84 MILLION/UL (ref 3.81–5.12)
WBC # BLD AUTO: 15.45 THOUSAND/UL (ref 4.31–10.16)

## 2023-06-07 PROCEDURE — 82728 ASSAY OF FERRITIN: CPT

## 2023-06-07 PROCEDURE — 85025 COMPLETE CBC W/AUTO DIFF WBC: CPT | Performed by: NURSE PRACTITIONER

## 2023-06-07 PROCEDURE — 99213 OFFICE O/P EST LOW 20 MIN: CPT | Performed by: NURSE PRACTITIONER

## 2023-06-07 PROCEDURE — 36415 COLL VENOUS BLD VENIPUNCTURE: CPT

## 2023-06-07 NOTE — PROGRESS NOTES
[address]  [facility]  OB/GYN prenatal visit    S: 28 y o  Perez [place] 32w0d here for PN visit  She has no obstetric complaints, including pelvic pain, contractions, vaginal bleeding, loss of fluid, or decreased fetal movement  US was rescheduled to 23 due to lack of transportation  Previously patient was given a work note with an addendum - to follow up with HR at work place  O:  Vitals:    23 0957   BP: 127/80   Pulse: 105   Resp: 18       Gen: no acute distress, nonlabored breathing  Fundal Height (cm): 33 cm  Fetal Heart Rate: 140    A/P:      IUP at 32w0d  No obstetric complaints today  16 lbs weight gain-11 to 20 pounds recommended in pregnancy  Needs to complete CBC and OB anemia panel, continues to take ferrous sulfate twice daily  Discussed  labor precautions and fetal kick counts    Return to clinic in 2 weeks    Problem List        Genitourinary    Cervical polyp    Overview     2 small cervical polyps approximately 0 8 cm in size  Reviewed precautions, call if persistent bleeding            Other    Anemia during pregnancy in third trimester    Overview     iron supplements 1 pill daily    Lab Results   Component Value Date    HGB 9 2 (L) 2023 Pt to complete CBC and OB iron panel         32 weeks gestation of pregnancy    Overview     Prenatal panel 23 HIV negative, RPR negative, Rubella immune, hep B, C negative  CL/GC negative 23  NIPT result which is low-risk  Last US 3/14/23, Anterior placenta--> f/u scheduled 23  28 labs with anemia--> PO iron started   Pregravid BMI 30 02, 11-20 lbs recommended in pregnancy  LDASA 162 mg daily until  36 weeks  COVID-vaccine x2  Flu vaccine- administered on 23  TDAP 23  Contraception- OCPs  Breastfeeding             Obesity in pregnancy, antepartum    Overview     Early 1 hour ordered  ASA until 36 weeks         History of palpitations    Overview     Outside of pregnancy   Started work up in [facility] [place] and no abnormalites found per her report  Not specific about what testing completed  Nml TSH  Pulse at prenatal visits thus far is normal, continue to monitor           Dizziness    Language barrier    Antepartum anemia         GIULIANA Martin  6/7/2023  10:54 AM

## 2023-06-07 NOTE — PATIENT INSTRUCTIONS
Pregnancy at 31 to 34 Weeks   AMBULATORY CARE:   Changes happening with your body: You may continue to have symptoms such as shortness of breath, heartburn, contractions, or swelling of your ankles and feet  You may be gaining about 1 pound a week now  Seek care immediately if:   You develop a severe headache that does not go away  You have new or increased vision changes, such as blurred or spotted vision  You have new or increased swelling in your face or hands  You have vaginal spotting or bleeding  Your water broke or you feel warm water gushing or trickling from your vagina  Call your obstetrician if:   You have more than 5 contractions in 1 hour  You notice any changes in your baby's movements  You have abdominal cramps, pressure, or tightening  You have a change in vaginal discharge  You have chills or a fever  You have vaginal itching, burning, or pain  You have yellow, green, white, or foul-smelling vaginal discharge  You have pain or burning when you urinate, less urine than usual, or pink or bloody urine  You have questions or concerns about your condition or care  How to care for yourself at this stage of your pregnancy:       Eat a variety of healthy foods  Healthy foods include fruits, vegetables, whole-grain breads, low-fat dairy foods, beans, lean meats, and fish  Drink liquids as directed  Ask how much liquid to drink each day and which liquids are best for you  Limit caffeine to less than 200 milligrams each day  Limit your intake of fish to 2 servings each week  Choose fish low in mercury such as canned light tuna, shrimp, salmon, cod, or tilapia  Do not  eat fish high in mercury such as swordfish, tilefish, kirill mackerel, and shark  Manage heartburn  by eating 4 or 5 small meals each day instead of large meals  Avoid spicy food  Manage swelling  by lying down and putting your feet up  Take prenatal vitamins as directed    Your need for certain vitamins and minerals, such as folic acid, increases during pregnancy  Prenatal vitamins provide some of the extra vitamins and minerals you need  Prenatal vitamins may also help to decrease the risk of certain birth defects  Talk to your healthcare provider about exercise  Moderate exercise can help you stay fit  Your healthcare provider will help you plan an exercise program that is safe for you during pregnancy  Do not smoke  Smoking increases your risk of a miscarriage and other health problems during your pregnancy  Smoking can cause your baby to be born too early or weigh less at birth  Ask your healthcare provider for information if you need help quitting  Do not drink alcohol  Alcohol passes from your body to your baby through the placenta  It can affect your baby's brain development and cause fetal alcohol syndrome (FAS)  FAS is a group of conditions that causes mental, behavior, and growth problems  Talk to your healthcare provider before you take any medicines  Many medicines may harm your baby if you take them when you are pregnant  Do not take any medicines, vitamins, herbs, or supplements without first talking to your healthcare provider  Never use illegal or street drugs (such as marijuana or cocaine) while you are pregnant  Safety tips during pregnancy:   Avoid hot tubs and saunas  Do not use a hot tub or sauna while you are pregnant, especially during your first trimester  Hot tubs and saunas may raise your baby's temperature and increase the risk of birth defects  Avoid toxoplasmosis  This is an infection caused by eating raw meat or being around infected cat feces  It can cause birth defects, miscarriages, and other problems  Wash your hands after you touch raw meat  Make sure any meat is well-cooked before you eat it  Avoid raw eggs and unpasteurized milk  Use gloves or ask someone else to clean your cat's litter box while you are pregnant  Changes happening with your baby:  By 34 weeks, your baby may weigh more than 5 pounds  Your baby will be about 12 ½ inches long from the top of the head to the rump (baby's bottom)  Your baby is gaining about ½ pound a week  Your baby's eyes open and close now  Your baby's kicks and movements are more forceful at this time  What you need to know about prenatal care: Your healthcare provider will check your blood pressure and weight  You may also need the following:  A urine test  may also be done to check for sugar and protein  These can be signs of gestational diabetes or infection  Protein in your urine may also be a sign of preeclampsia  Preeclampsia is a condition that can develop during week 20 or later of your pregnancy  It causes high blood pressure, and it can cause problems with your kidneys and other organs  A gestational diabetes screen  may be done  Your healthcare provider may order either a 1-step or 2-step oral glucose tolerance test (OGTT)  1-step OGTT:  Your blood sugar level will be tested after you have not eaten for 8 hours (fasting)  You will then be given a glucose drink  Your level will be tested again 1 hour and 2 hours after you finish the drink  2-step OGTT:  You do not have to fast for the first part of the test  You will have the glucose drink at any time of day  Your blood sugar level will be checked 1 hour later  If your blood sugar is higher than a certain level, another test will be ordered  You will fast and your blood sugar level will be tested  You will have the glucose drink  Your blood will be tested again 1 hour, 2 hours, and 3 hours after you finish the glucose drink  A Tdap vaccine  may be recommended by your healthcare provider  Fundal height  is a measurement of your uterus to check your baby's growth  This number is usually the same as the number of weeks that you have been pregnant   Your healthcare provider may also check your baby's position  Your baby's heart rate  will be checked  Follow up with your obstetrician as directed:  Write down your questions so you remember to ask them during your visits  © Copyright Denise Nieto 2022 Information is for End User's use only and may not be sold, redistributed or otherwise used for commercial purposes  The above information is an  only  It is not intended as medical advice for individual conditions or treatments  Talk to your doctor, nurse or pharmacist before following any medical regimen to see if it is safe and effective for you

## 2023-06-09 ENCOUNTER — TELEPHONE (OUTPATIENT)
Dept: OBGYN CLINIC | Facility: CLINIC | Age: 28
End: 2023-06-09

## 2023-06-09 NOTE — TELEPHONE ENCOUNTER
----- Message from Marlin Worrell, [address]  sent at 6/8/2023  4:56 PM EDT -----  Please call pt to inform that her anemia improved with iron supplements, recommend for her to continue    Thanks

## 2023-06-09 NOTE — TELEPHONE ENCOUNTER
ID [id number]    [id number] and spoke to patient, informed her of the results and recommendations  Patient verbalized understanding, no questions

## 2023-06-20 PROBLEM — Z3A.34 34 WEEKS GESTATION OF PREGNANCY: Status: ACTIVE | Noted: 2023-01-26

## 2023-06-20 NOTE — PROGRESS NOTES
[address]  [facility]  OB/GYN prenatal visit    S: 28 y o  [place] 34w0d here for PN visit,  [id number] used she has no obstetric complaints, including pelvic pain, contractions, vaginal bleeding, loss of fluid, or decreased fetal movement  O:  Vitals:    23 0953   BP: 111/75   Pulse: 96   Resp: 18       Gen: no acute distress, nonlabored breathing  Fundal Height (cm): 34 cm  Fetal Heart Rate: 134    A/P:      IUP at 34w0d  No obstetric complaints today  TWG: + 17 lbs  ( 11-20 lbs recommended)  Perineal massage reviewed today  Discussed  labor precautions and fetal kick counts    Return to clinic in 2 weeks    Problem List        Genitourinary    Cervical polyp    Overview     2 small cervical polyps approximately 0 8 cm in size  Reviewed precautions, call if persistent bleeding            Other    Anemia during pregnancy in third trimester    Overview     iron supplements 1 pill daily    Lab Results   Component Value Date    HGB 9 2 (L) 2023 Pt to complete CBC and OB iron panel  hgb  10 3,   Ferritin 4, continue iron  supplements         34 weeks gestation of pregnancy    Overview     Prenatal panel 23 HIV negative, RPR negative, Rubella immune, hep B, C negative  CL/GC negative 23  NIPT result which is low-risk  Last US 3/14/23, Anterior placenta--> f/u scheduled 23  28 labs with anemia--> PO iron started   Pregravid BMI 30 02, 11-20 lbs recommended in pregnancy  LDASA 162 mg daily until  36 weeks  COVID-vaccine x2  Flu vaccine- administered on 23  TDAP 23  Perineal massage reviewed  Contraception- OCPs  Breastfeeding             Obesity in pregnancy, antepartum    Overview     Early 1 hour ordered  ASA until 36 weeks         History of palpitations    Overview     Outside of pregnancy  Started work up in [facility] [place] and no abnormalites found per her report  Not specific about what testing completed  Nml TSH     Pulse at prenatal visits thus far is normal, continue to monitor           Dizziness    Language barrier    Antepartum anemia       GIULIANA Wade  6/21/2023  10:07 AM

## 2023-06-21 ENCOUNTER — ROUTINE PRENATAL (OUTPATIENT)
Dept: OBGYN CLINIC | Facility: CLINIC | Age: 28
End: 2023-06-21

## 2023-06-21 VITALS
WEIGHT: 192 LBS | SYSTOLIC BLOOD PRESSURE: 111 MMHG | DIASTOLIC BLOOD PRESSURE: 75 MMHG | HEART RATE: 96 BPM | RESPIRATION RATE: 18 BRPM | BODY MASS INDEX: 32.78 KG/M2 | HEIGHT: 64 IN

## 2023-06-21 DIAGNOSIS — Z78.9 LANGUAGE BARRIER: ICD-10-CM

## 2023-06-21 DIAGNOSIS — Z3A.34 34 WEEKS GESTATION OF PREGNANCY: Primary | ICD-10-CM

## 2023-06-21 DIAGNOSIS — O99.013 ANEMIA DURING PREGNANCY IN THIRD TRIMESTER: ICD-10-CM

## 2023-06-21 PROCEDURE — 99213 OFFICE O/P EST LOW 20 MIN: CPT | Performed by: NURSE PRACTITIONER

## 2023-06-29 ENCOUNTER — ULTRASOUND (OUTPATIENT)
Facility: HOSPITAL | Age: 28
End: 2023-06-29

## 2023-06-29 VITALS
HEART RATE: 104 BPM | SYSTOLIC BLOOD PRESSURE: 104 MMHG | DIASTOLIC BLOOD PRESSURE: 66 MMHG | WEIGHT: 192.6 LBS | BODY MASS INDEX: 32.88 KG/M2 | HEIGHT: 64 IN

## 2023-06-29 DIAGNOSIS — Z3A.35 35 WEEKS GESTATION OF PREGNANCY: Primary | ICD-10-CM

## 2023-06-29 DIAGNOSIS — O99.210 OBESITY IN PREGNANCY, ANTEPARTUM: ICD-10-CM

## 2023-06-29 PROCEDURE — 76816 OB US FOLLOW-UP PER FETUS: CPT | Performed by: OBSTETRICS & GYNECOLOGY

## 2023-06-29 NOTE — LETTER
June 30, 2023     Guera Pruitt MD  [address] [address]    Patient: Eduarda Dumont   YOB: 1995   Date of Visit: 6/29/2023       Dear Dr Evan Jeffery: Thank you for referring Eduarda Dumont to me for evaluation  Below are my notes for this consultation  If you have questions, please do not hesitate to call me  I look forward to following your patient along with you  Sincerely,        Brielle Petersen MD        CC: No Recipients    Brielle Petersen MD  6/30/2023  7:55 AM  Sign when Signing Visit  A fetal ultrasound was completed  See Ob procedures in Epic for an interpretation and recommendations  Do not hesitate to contact us in [facility] if you have questions  So Ziegler MD, [address] [facility]  Maternal Fetal Medicine

## 2023-06-30 PROBLEM — Z3A.35 35 WEEKS GESTATION OF PREGNANCY: Status: ACTIVE | Noted: 2023-01-26

## 2023-06-30 NOTE — PROGRESS NOTES
A fetal ultrasound was completed  See Ob procedures in Epic for an interpretation and recommendations  Do not hesitate to contact us in [facility] if you have questions  Sean Timmons MD, [address] [facility]  Maternal Fetal Medicine

## 2023-07-01 ENCOUNTER — HOSPITAL ENCOUNTER (EMERGENCY)
Facility: HOSPITAL | Age: 28
Discharge: HOME | End: 2023-07-01
Attending: EMERGENCY MEDICINE

## 2023-07-01 VITALS
SYSTOLIC BLOOD PRESSURE: 117 MMHG | HEART RATE: 103 BPM | TEMPERATURE: 97.9 F | OXYGEN SATURATION: 100 % | DIASTOLIC BLOOD PRESSURE: 79 MMHG | RESPIRATION RATE: 17 BRPM

## 2023-07-01 DIAGNOSIS — M54.2 NECK PAIN: ICD-10-CM

## 2023-07-01 DIAGNOSIS — M79.601 PAIN IN BOTH UPPER EXTREMITIES: Primary | ICD-10-CM

## 2023-07-01 DIAGNOSIS — M79.602 PAIN IN BOTH UPPER EXTREMITIES: Primary | ICD-10-CM

## 2023-07-01 PROCEDURE — 99282 EMERGENCY DEPT VISIT SF MDM: CPT

## 2023-07-01 RX ORDER — LIDOCAINE 50 MG/G
1 PATCH TOPICAL ONCE
Status: DISCONTINUED | OUTPATIENT
Start: 2023-07-01 | End: 2023-07-01 | Stop reason: HOSPADM

## 2023-07-01 RX ORDER — LIDOCAINE 50 MG/G
1 PATCH TOPICAL DAILY
Qty: 10 PATCH | Refills: 0 | Status: ON HOLD | OUTPATIENT
Start: 2023-07-01

## 2023-07-01 RX ADMIN — LIDOCAINE 1 PATCH: 700 PATCH TOPICAL at 13:24

## 2023-07-01 NOTE — DISCHARGE INSTRUCTIONS
You can take 650 mg of tylenol every 6 hours as needed for pain. You can also try ice and heat and massage. We have prescribed lidocaine patches for your neck. Other pain medications are not safe in pregnancy. Switch positions frequently while sleeping and position pillows for comfort. The spine center will call you to arrange treatment.

## 2023-07-01 NOTE — ED PROVIDER NOTES
History  Chief Complaint   Patient presents with   • Arm Pain     Pt presents with b/l arm pain for 1 week, denies BAHMAN, states pain starts in her neck, pain down to fingers. Pt is 35.5w preg     26-year-old female G1, P0 currently 35 weeks pregnant presents with 1 week of atraumatic bilateral arm and hand pain. Patient states that she has aching and cramps in her hands as well as her entire bilateral arms. She reports some intermittent neck pain. No inciting injury. She states that she has relatively new job that involves a lot of lifting and working with her hands that she thinks may be related to the pain. She has been taking 500 mg of Tylenol 3 times a day. She denies any change in her pain with head or neck movements. She reports the pain is worse when she lays down. She is usually a stomach sleeper but has been sleeping on her side recently due to pregnancy. She denies numbness or weakness of her hands. Prior to Admission Medications   Prescriptions Last Dose Informant Patient Reported? Taking?    Prenatal Vit-Fe Fumarate-FA ( Prenatal Vitamins) 28-0.8 MG TABS  Self No No   Sig: Take 1 tablet by mouth in the morning   Pyridoxine HCl (vitamin B-6) 25 MG tablet  Self No No   Sig: Take 1 tablet (25 mg total) by mouth 3 (three) times a day   Patient not taking: Reported on 4/11/2023   aspirin 81 mg chewable tablet  Self No No   Sig: Chew 2 tablets (162 mg total) daily   doxylamine (UNISOM) 25 MG tablet  Self No No   Sig: Take 1 tablet (25 mg total) by mouth daily at bedtime as needed for sleep or nausea   Patient not taking: Reported on 4/11/2023   ferrous sulfate 324 (65 Fe) mg  Self No No   Sig: Take 1 tablet (324 mg total) by mouth 2 (two) times a day before meals   ondansetron (ZOFRAN) 4 mg tablet  Self No No   Sig: Take 1 tablet (4 mg total) by mouth every 8 (eight) hours as needed for nausea or vomiting   Patient not taking: Reported on 4/11/2023      Facility-Administered Medications: None Past Medical History:   Diagnosis Date   • Anemia    • Migraines    • Polycystic ovary syndrome        History reviewed. No pertinent surgical history. Family History   Problem Relation Age of Onset   • No Known Problems Mother    • No Known Problems Father    • No Known Problems Sister    • No Known Problems Sister    • No Known Problems Brother    • No Known Problems Brother    • Diabetes Maternal Grandmother    • Diabetes Paternal Grandmother      I have reviewed and agree with the history as documented. E-Cigarette/Vaping   • E-Cigarette Use Never User      E-Cigarette/Vaping Substances   • Nicotine No    • THC No    • CBD No    • Flavoring No    • Other No    • Unknown No      Social History     Tobacco Use   • Smoking status: Never   • Smokeless tobacco: Never   Vaping Use   • Vaping Use: Never used   Substance Use Topics   • Alcohol use: Not Currently   • Drug use: Never       Review of Systems   Constitutional: Negative for chills and fever. Musculoskeletal: Positive for arthralgias and myalgias. Physical Exam  Physical Exam  Constitutional:       General: She is not in acute distress. Appearance: Normal appearance. HENT:      Head: Normocephalic and atraumatic. Nose: Nose normal.      Mouth/Throat:      Mouth: Mucous membranes are moist.   Eyes:      Extraocular Movements: Extraocular movements intact. Conjunctiva/sclera: Conjunctivae normal.      Pupils: Pupils are equal, round, and reactive to light. Cardiovascular:      Rate and Rhythm: Normal rate and regular rhythm. Pulmonary:      Effort: Pulmonary effort is normal.   Abdominal:      Palpations: Abdomen is soft. Comments: gravid   Musculoskeletal:         General: No swelling, tenderness or deformity. Normal range of motion. Cervical back: Normal range of motion and neck supple. No rigidity or tenderness.       Comments: Normal strength and sensation of bilateral hands, normal radial pulses, normal range of motion of wrist elbow and shoulder. No midline spinal tenderness. No joint swelling or erythema   Skin:     General: Skin is warm and dry. Neurological:      General: No focal deficit present. Mental Status: She is alert and oriented to person, place, and time. Psychiatric:         Mood and Affect: Mood normal.         Behavior: Behavior normal.         Vital Signs  ED Triage Vitals   Temperature Pulse Respirations Blood Pressure SpO2   07/01/23 1227 07/01/23 1226 07/01/23 1226 07/01/23 1226 07/01/23 1226   97.9 °F (36.6 °C) 103 17 117/79 100 %      Temp Source Heart Rate Source Patient Position - Orthostatic VS BP Location FiO2 (%)   07/01/23 1227 07/01/23 1226 -- -- --   Oral Monitor         Pain Score       --                  Vitals:    07/01/23 1226   BP: 117/79   Pulse: 103         Visual Acuity      ED Medications  Medications   lidocaine (LIDODERM) 5 % patch 1 patch (1 patch Topical Medication Applied 7/1/23 1324)       Diagnostic Studies  Results Reviewed     None                 No orders to display              Procedures  Procedures         ED Course     Healthy 68-year-old female 35 weeks pregnant with bilateral arm and hand pain. No neurologic symptoms. Normal physical exam.  No red flag symptoms for spinal compression. Based on the context of patient starting a new job involving physical labor with her arms and hands suspect that this is causing her pain. It is also highly likely that patient's new change in sleeping positions is aggravating her symptoms. Based on patient's third trimester pregnancy there are limited analgesia options. Advised to continue Tylenol as well as a trial of lidocaine patches. Will refer to [facility] for neck pain. Also provided patient with a note excusing her from work until cleared by PCP or orthopedics. SBIRT 20yo+    Flowsheet Row Most Recent Value   Initial Alcohol Screen: US AUDIT-C     1.  How often do you have a drink containing alcohol? 0 Filed at: 07/01/2023 1224   2. How many drinks containing alcohol do you have on a typical day you are drinking? 0 Filed at: 07/01/2023 1224   3a. Male UNDER 65: How often do you have five or more drinks on one occasion? 0 Filed at: 07/01/2023 1224   3b. FEMALE Any Age, or MALE 65+: How often do you have 4 or more drinks on one occassion? 0 Filed at: 07/01/2023 1224   Audit-C Score 0 Filed at: 07/01/2023 1224   JEREMY: How many times in the past year have you. .. Used an illegal drug or used a prescription medication for non-medical reasons? Never Filed at: 07/01/2023 1224                    MDM    Disposition  Final diagnoses:   Pain in both upper extremities   Neck pain     Time reflects when diagnosis was documented in both MDM as applicable and the Disposition within this note     Time User Action Codes Description Comment    7/1/2023  1:04 PM Sera Lacy Add [F59.753,  M79.602] Pain in both upper extremities     7/1/2023  1:04 PM Sera Lacy Add [M54.2] Neck pain       ED Disposition     ED Disposition   Discharge    Condition   Stable    Date/Time   Sat Jul 1, 2023 [address] [facility],[address] discharge to home/self care.                Follow-up Information    None         Discharge Medication List as of 7/1/2023  1:14 PM      START taking these medications    Details   lidocaine (Lidoderm) 5 % Apply 1 patch topically over 12 hours daily Remove & Discard patch within 12 hours or as directed by MD, Starting Sat 7/1/2023, Normal         CONTINUE these medications which have NOT CHANGED    Details   aspirin 81 mg chewable tablet Chew 2 tablets (162 mg total) daily, Starting Thu 1/26/2023, Normal      doxylamine (UNISOM) 25 MG tablet Take 1 tablet (25 mg total) by mouth daily at bedtime as needed for sleep or nausea, Starting Wed 12/21/2022, Normal      ferrous sulfate 324 (65 Fe) mg Take 1 tablet (324 mg total) by mouth 2 (two) times a day before meals, Starting Tue 5/9/2023, Normal      ondansetron (ZOFRAN) 4 mg tablet Take 1 tablet (4 mg total) by mouth every 8 (eight) hours as needed for nausea or vomiting, Starting Fri 12/30/2022, Normal      Prenatal Vit-Fe Fumarate-FA (SM Prenatal Vitamins) 28-0.8 MG TABS Take 1 tablet by mouth in the morning, Starting Wed 12/21/2022, Normal      Pyridoxine HCl (vitamin B-6) 25 MG tablet Take 1 tablet (25 mg total) by mouth 3 (three) times a day, Starting Wed 12/21/2022, Normal                 PDMP Review     None          ED Provider  Electronically Signed by           Rakan Thompson MD  07/01/23 8079

## 2023-07-01 NOTE — Clinical Note
Santiago Garibay was seen and treated in our emergency department on 7/1/2023. No work until cleared by Family Doctor/Orthopedics        Diagnosis:     Cecilia  . She may return on this date: If you have any questions or concerns, please don't hesitate to call.       Carly Baker MD    ______________________________           _______________          _______________  Hospital Representative                              Date                                Time

## 2023-07-03 ENCOUNTER — TELEPHONE (OUTPATIENT)
Dept: PHYSICAL THERAPY | Facility: OTHER | Age: 28
End: 2023-07-03

## 2023-07-03 NOTE — TELEPHONE ENCOUNTER
Call placed to the patient per [facility] referral.    Spoke with patient, explained CSP and reason for the call ([facility]). Patient states she is interested in the program however she doesn't have Health Insurance at the moment and can't afford self pay. Patient will apply for insurance and once is active, she will call back. Phone number and hours of business provided. Will close referral per protocol.

## 2023-07-05 PROBLEM — O99.019 ANTEPARTUM ANEMIA: Status: RESOLVED | Noted: 2023-06-07 | Resolved: 2023-07-05

## 2023-07-05 PROBLEM — Z3A.36 36 WEEKS GESTATION OF PREGNANCY: Status: ACTIVE | Noted: 2023-01-26

## 2023-07-05 NOTE — ASSESSMENT & PLAN NOTE
No obstetric complaints today  GBS collected today. No penicillin allergy.   [facility] 6/29/23 EFW  2829 grams - 6 lbs 4 oz (68%)  TWG: + 7.893 kg   Discontinue LDASA  Discussed term labor precautions and fetal kick counts    Return to clinic in 1 weeks

## 2023-07-05 NOTE — ASSESSMENT & PLAN NOTE
Increase in Hgb 9.2 --> 10.13 with PO iron  Continue PO iron    Lab Results   Component Value Date    HGB 10.3 (L) 06/07/2023

## 2023-07-06 ENCOUNTER — ROUTINE PRENATAL (OUTPATIENT)
Dept: OBGYN CLINIC | Facility: CLINIC | Age: 28
End: 2023-07-06

## 2023-07-06 VITALS
WEIGHT: 192.4 LBS | RESPIRATION RATE: 16 BRPM | BODY MASS INDEX: 33.03 KG/M2 | SYSTOLIC BLOOD PRESSURE: 126 MMHG | DIASTOLIC BLOOD PRESSURE: 84 MMHG | HEART RATE: 84 BPM

## 2023-07-06 DIAGNOSIS — Z34.93 PRENATAL CARE IN THIRD TRIMESTER: Primary | ICD-10-CM

## 2023-07-06 DIAGNOSIS — O99.013 ANEMIA DURING PREGNANCY IN THIRD TRIMESTER: ICD-10-CM

## 2023-07-06 DIAGNOSIS — Z3A.36 36 WEEKS GESTATION OF PREGNANCY: ICD-10-CM

## 2023-07-06 DIAGNOSIS — O99.210 OBESITY IN PREGNANCY, ANTEPARTUM: ICD-10-CM

## 2023-07-06 PROCEDURE — 87150 DNA/RNA AMPLIFIED PROBE: CPT | Performed by: OBSTETRICS & GYNECOLOGY

## 2023-07-06 PROCEDURE — 99213 OFFICE O/P EST LOW 20 MIN: CPT | Performed by: OBSTETRICS & GYNECOLOGY

## 2023-07-09 PROBLEM — A49.1 GBS (GROUP B STREPTOCOCCUS) INFECTION: Status: ACTIVE | Noted: 2023-07-09

## 2023-07-09 LAB — GP B STREP DNA SPEC QL NAA+PROBE: POSITIVE

## 2023-07-13 ENCOUNTER — ROUTINE PRENATAL (OUTPATIENT)
Dept: OBGYN CLINIC | Facility: CLINIC | Age: 28
End: 2023-07-13

## 2023-07-13 VITALS
BODY MASS INDEX: 32.95 KG/M2 | WEIGHT: 193 LBS | DIASTOLIC BLOOD PRESSURE: 77 MMHG | HEIGHT: 64 IN | HEART RATE: 88 BPM | RESPIRATION RATE: 18 BRPM | SYSTOLIC BLOOD PRESSURE: 115 MMHG

## 2023-07-13 DIAGNOSIS — O99.013 ANEMIA DURING PREGNANCY IN THIRD TRIMESTER: Primary | ICD-10-CM

## 2023-07-13 DIAGNOSIS — A49.1 GBS (GROUP B STREPTOCOCCUS) INFECTION: ICD-10-CM

## 2023-07-13 DIAGNOSIS — Z3A.37 37 WEEKS GESTATION OF PREGNANCY: ICD-10-CM

## 2023-07-13 PROCEDURE — 99213 OFFICE O/P EST LOW 20 MIN: CPT | Performed by: OBSTETRICS & GYNECOLOGY

## 2023-07-13 NOTE — PROGRESS NOTES
[facility]  Name: Elder Malone  MRN: [id number]  : 1995      ASSESSMENT/PLAN:  Problem List        Genitourinary    Cervical polyp    Overview     2 small cervical polyps approximately 0.8 cm in size  Reviewed precautions, call if persistent bleeding            Other    Anemia during pregnancy in third trimester    Overview     iron supplements 1 pill daily    Lab Results   Component Value Date    HGB 9.2 (L) 2023 Pt to complete CBC and OB iron panel  hgb  10.3,   Ferritin 4, continue iron  supplements         37 weeks gestation of pregnancy    Overview     Prenatal panel 23 HIV negative, RPR negative, Rubella immune, hep B, C negative. CL/GC negative 23  NIPT result which is low-risk  AC 91%ile, HC 28%ile --> discuss risk for shoulder dystocia next visit  28 labs with anemia--> PO iron started   Pregravid BMI 30.02, 11-20 lbs recommended in pregnancy  LDASA 162 mg daily until  36 weeks  COVID-vaccine x2  Flu vaccine- administered on 23  TDAP 23  Perineal massage reviewed  Contraception- OCPs  Breastfeeding  SVE 23: cl/th/h             Obesity in pregnancy, antepartum    Overview     Early 1 hour ordered  ASA until 36 weeks         History of palpitations    Overview     Outside of pregnancy. [place] in [place] and no abnormalites found per her report. Not specific about what testing completed. Nml TSH. Pulse at prenatal visits thus far is normal, continue to monitor. Dizziness    Language barrier    GBS (group B streptococcus) infection    Overview     Will require intrapartum penicillin              SUBJECTIVE 28 y.o. [place] 37w2d here for PN visit. She denies contractions. She denies leakage of fluid and vaginal bleeding. She reports good fetal movement.      OBJECTIVE:  Vitals:    23 1635   BP: 115/77   Pulse: 88   Resp: 18       Physical Exam    Fundal height: 37 cm  FHT: 132 bpm Future Appointments   Date Time Provider [address]00  46[facility]   7/20/2023  4:15 PM Gabriele Gorman MD [address] [facility] [address]         Nathaly Washington MD  OB/GYN PGY-4  7/13/2023  4:47 PM

## 2023-07-16 ENCOUNTER — HOSPITAL ENCOUNTER (INPATIENT)
Facility: HOSPITAL | Age: 28
LOS: 3 days | Discharge: HOME | DRG: 560 | End: 2023-07-19
Attending: OBSTETRICS & GYNECOLOGY | Admitting: OBSTETRICS & GYNECOLOGY
Payer: COMMERCIAL

## 2023-07-16 DIAGNOSIS — Z3A.37 37 WEEKS GESTATION OF PREGNANCY: ICD-10-CM

## 2023-07-16 PROBLEM — O36.8390 NON-REASSURING FETAL HEART RATE WITH LATE DECELERATION: Status: ACTIVE | Noted: 2023-07-16

## 2023-07-16 LAB
ABO GROUP BLD: NORMAL
BACTERIA UR QL AUTO: ABNORMAL /HPF
BASOPHILS # BLD AUTO: 0.05 THOUSANDS/ÂΜL (ref 0–0.1)
BASOPHILS NFR BLD AUTO: 0 % (ref 0–1)
BILIRUB UR QL STRIP: NEGATIVE
BLD GP AB SCN SERPL QL: NEGATIVE
CAOX CRY URNS QL MICRO: ABNORMAL /HPF
CLARITY UR: ABNORMAL
COLOR UR: ABNORMAL
EOSINOPHIL # BLD AUTO: 0.05 THOUSAND/ÂΜL (ref 0–0.61)
EOSINOPHIL NFR BLD AUTO: 0 % (ref 0–6)
ERYTHROCYTE [DISTWIDTH] IN BLOOD BY AUTOMATED COUNT: 19.8 % (ref 11.6–15.1)
GLUCOSE UR STRIP-MCNC: NEGATIVE MG/DL
HCT VFR BLD AUTO: 36.5 % (ref 34.8–46.1)
HGB BLD-MCNC: 10.8 G/DL (ref 11.5–15.4)
HGB UR QL STRIP.AUTO: NEGATIVE
IMM GRANULOCYTES # BLD AUTO: 0.11 THOUSAND/UL (ref 0–0.2)
IMM GRANULOCYTES NFR BLD AUTO: 1 % (ref 0–2)
KETONES UR STRIP-MCNC: NEGATIVE MG/DL
LEUKOCYTE ESTERASE UR QL STRIP: NEGATIVE
LYMPHOCYTES # BLD AUTO: 4.29 THOUSANDS/ÂΜL (ref 0.6–4.47)
LYMPHOCYTES NFR BLD AUTO: 25 % (ref 14–44)
MCH RBC QN AUTO: 21.6 PG (ref 26.8–34.3)
MCHC RBC AUTO-ENTMCNC: 29.6 G/DL (ref 31.4–37.4)
MCV RBC AUTO: 73 FL (ref 82–98)
MONOCYTES # BLD AUTO: 1.11 THOUSAND/ÂΜL (ref 0.17–1.22)
MONOCYTES NFR BLD AUTO: 6 % (ref 4–12)
MUCOUS THREADS UR QL AUTO: ABNORMAL
NEUTROPHILS # BLD AUTO: 11.75 THOUSANDS/ÂΜL (ref 1.85–7.62)
NEUTS SEG NFR BLD AUTO: 68 % (ref 43–75)
NITRITE UR QL STRIP: NEGATIVE
NON-SQ EPI CELLS URNS QL MICRO: ABNORMAL /HPF
NRBC BLD AUTO-RTO: 0 /100 WBCS
PH UR STRIP.AUTO: 5.5 [PH]
PLATELET # BLD AUTO: 312 THOUSANDS/UL (ref 149–390)
PMV BLD AUTO: 11.9 FL (ref 8.9–12.7)
PROT UR STRIP-MCNC: ABNORMAL MG/DL
RBC # BLD AUTO: 5.01 MILLION/UL (ref 3.81–5.12)
RBC #/AREA URNS AUTO: ABNORMAL /HPF
RH BLD: POSITIVE
SP GR UR STRIP.AUTO: 1.03 (ref 1–1.03)
SPECIMEN EXPIRATION DATE: NORMAL
UROBILINOGEN UR STRIP-ACNC: <2 MG/DL
WBC # BLD AUTO: 17.36 THOUSAND/UL (ref 4.31–10.16)
WBC #/AREA URNS AUTO: ABNORMAL /HPF

## 2023-07-16 PROCEDURE — 81001 URINALYSIS AUTO W/SCOPE: CPT

## 2023-07-16 PROCEDURE — NC001 PR NO CHARGE: Performed by: OBSTETRICS & GYNECOLOGY

## 2023-07-16 PROCEDURE — 99214 OFFICE O/P EST MOD 30 MIN: CPT

## 2023-07-16 PROCEDURE — 86920 COMPATIBILITY TEST SPIN: CPT

## 2023-07-16 PROCEDURE — 86900 BLOOD TYPING SEROLOGIC ABO: CPT

## 2023-07-16 PROCEDURE — 86850 RBC ANTIBODY SCREEN: CPT

## 2023-07-16 PROCEDURE — 85025 COMPLETE CBC W/AUTO DIFF WBC: CPT

## 2023-07-16 PROCEDURE — 86901 BLOOD TYPING SEROLOGIC RH(D): CPT

## 2023-07-16 PROCEDURE — 86780 TREPONEMA PALLIDUM: CPT

## 2023-07-16 RX ORDER — OXYTOCIN/RINGER'S LACTATE 30/500 ML
1-30 PLASTIC BAG, INJECTION (ML) INTRAVENOUS
Status: DISCONTINUED | OUTPATIENT
Start: 2023-07-16 | End: 2023-07-17

## 2023-07-16 RX ORDER — BUPIVACAINE HYDROCHLORIDE 2.5 MG/ML
30 INJECTION, SOLUTION EPIDURAL; INFILTRATION; INTRACAUDAL ONCE AS NEEDED
Status: DISCONTINUED | OUTPATIENT
Start: 2023-07-16 | End: 2023-07-17

## 2023-07-16 RX ORDER — SODIUM CHLORIDE, SODIUM LACTATE, POTASSIUM CHLORIDE, CALCIUM CHLORIDE 600; 310; 30; 20 MG/100ML; MG/100ML; MG/100ML; MG/100ML
125 INJECTION, SOLUTION INTRAVENOUS CONTINUOUS
Status: DISCONTINUED | OUTPATIENT
Start: 2023-07-16 | End: 2023-07-17

## 2023-07-16 RX ADMIN — SODIUM CHLORIDE 5 MILLION UNITS: 0.9 INJECTION, SOLUTION INTRAVENOUS at 22:32

## 2023-07-16 RX ADMIN — SODIUM CHLORIDE, SODIUM LACTATE, POTASSIUM CHLORIDE, AND CALCIUM CHLORIDE 125 ML/HR: .6; .31; .03; .02 INJECTION, SOLUTION INTRAVENOUS at 22:32

## 2023-07-16 RX ADMIN — MORPHINE SULFATE 2 MG: 2 INJECTION, SOLUTION INTRAMUSCULAR; INTRAVENOUS at 23:37

## 2023-07-16 RX ADMIN — Medication 2 MILLI-UNITS/MIN: at 22:58

## 2023-07-17 ENCOUNTER — ANESTHESIA EVENT (INPATIENT)
Dept: ANESTHESIOLOGY | Facility: HOSPITAL | Age: 28
DRG: 560 | End: 2023-07-17
Payer: COMMERCIAL

## 2023-07-17 ENCOUNTER — ANESTHESIA (INPATIENT)
Dept: ANESTHESIOLOGY | Facility: HOSPITAL | Age: 28
DRG: 560 | End: 2023-07-17
Payer: COMMERCIAL

## 2023-07-17 LAB
BASE EXCESS BLDCOA CALC-SCNC: -8.8 MMOL/L (ref 3–11)
BASE EXCESS BLDCOV CALC-SCNC: -6.3 MMOL/L (ref 1–9)
HCO3 BLDCOA-SCNC: 20.3 MMOL/L (ref 17.3–27.3)
HCO3 BLDCOV-SCNC: 19.9 MMOL/L (ref 12.2–28.6)
HOLD SPECIMEN: NORMAL
O2 CT VFR BLDCOA CALC: 3.8 ML/DL
OXYHGB MFR BLDCOA: 18.1 %
OXYHGB MFR BLDCOV: 61.9 %
PCO2 BLDCOA: 57 MM[HG] (ref 30–60)
PCO2 BLDCOV: 41.9 MM HG (ref 27–43)
PH BLDCOA: 7.17 [PH] (ref 7.23–7.43)
PH BLDCOV: 7.29 [PH] (ref 7.19–7.49)
PO2 BLDCOA: 14.1 MM HG (ref 5–25)
PO2 BLDCOV: 28.4 MM HG (ref 15–45)
SAO2 % BLDCOV: 13.5 ML/DL
TREPONEMA PALLIDUM IGG+IGM AB [PRESENCE] IN SERUM OR PLASMA BY IMMUNOASSAY: NORMAL

## 2023-07-17 PROCEDURE — 85384 FIBRINOGEN ACTIVITY: CPT | Performed by: OBSTETRICS & GYNECOLOGY

## 2023-07-17 PROCEDURE — 85730 THROMBOPLASTIN TIME PARTIAL: CPT | Performed by: OBSTETRICS & GYNECOLOGY

## 2023-07-17 PROCEDURE — 4A1HXCZ MONITORING OF PRODUCTS OF CONCEPTION, CARDIAC RATE, EXTERNAL APPROACH: ICD-10-PCS | Performed by: STUDENT IN AN ORGANIZED HEALTH CARE EDUCATION/TRAINING PROGRAM

## 2023-07-17 PROCEDURE — 0HQ9XZZ REPAIR PERINEUM SKIN, EXTERNAL APPROACH: ICD-10-PCS | Performed by: STUDENT IN AN ORGANIZED HEALTH CARE EDUCATION/TRAINING PROGRAM

## 2023-07-17 PROCEDURE — 85610 PROTHROMBIN TIME: CPT | Performed by: OBSTETRICS & GYNECOLOGY

## 2023-07-17 PROCEDURE — 82805 BLOOD GASES W/O2 SATURATION: CPT | Performed by: OBSTETRICS & GYNECOLOGY

## 2023-07-17 PROCEDURE — 59409 OBSTETRICAL CARE: CPT | Performed by: STUDENT IN AN ORGANIZED HEALTH CARE EDUCATION/TRAINING PROGRAM

## 2023-07-17 PROCEDURE — 10907ZC DRAINAGE OF AMNIOTIC FLUID, THERAPEUTIC FROM PRODUCTS OF CONCEPTION, VIA NATURAL OR ARTIFICIAL OPENING: ICD-10-PCS | Performed by: STUDENT IN AN ORGANIZED HEALTH CARE EDUCATION/TRAINING PROGRAM

## 2023-07-17 PROCEDURE — 10H07YZ INSERTION OF OTHER DEVICE INTO PRODUCTS OF CONCEPTION, VIA NATURAL OR ARTIFICIAL OPENING: ICD-10-PCS | Performed by: STUDENT IN AN ORGANIZED HEALTH CARE EDUCATION/TRAINING PROGRAM

## 2023-07-17 PROCEDURE — 3E033VJ INTRODUCTION OF OTHER HORMONE INTO PERIPHERAL VEIN, PERCUTANEOUS APPROACH: ICD-10-PCS | Performed by: STUDENT IN AN ORGANIZED HEALTH CARE EDUCATION/TRAINING PROGRAM

## 2023-07-17 PROCEDURE — 85025 COMPLETE CBC W/AUTO DIFF WBC: CPT | Performed by: OBSTETRICS & GYNECOLOGY

## 2023-07-17 PROCEDURE — 80053 COMPREHEN METABOLIC PANEL: CPT | Performed by: OBSTETRICS & GYNECOLOGY

## 2023-07-17 RX ORDER — IBUPROFEN 600 MG/1
600 TABLET ORAL EVERY 6 HOURS PRN
Status: DISCONTINUED | OUTPATIENT
Start: 2023-07-17 | End: 2023-07-17

## 2023-07-17 RX ORDER — ROPIVACAINE HYDROCHLORIDE 2 MG/ML
INJECTION, SOLUTION EPIDURAL; INFILTRATION; PERINEURAL AS NEEDED
Status: DISCONTINUED | OUTPATIENT
Start: 2023-07-17 | End: 2023-07-24 | Stop reason: HOSPADM

## 2023-07-17 RX ORDER — DIPHENHYDRAMINE HYDROCHLORIDE 50 MG/ML
25 INJECTION INTRAMUSCULAR; INTRAVENOUS EVERY 6 HOURS PRN
Status: DISCONTINUED | OUTPATIENT
Start: 2023-07-17 | End: 2023-07-19 | Stop reason: HOSPADM

## 2023-07-17 RX ORDER — METHYLERGONOVINE MALEATE 0.2 MG/ML
INJECTION INTRAVENOUS
Status: COMPLETED
Start: 2023-07-17 | End: 2023-07-17

## 2023-07-17 RX ORDER — CARBOPROST TROMETHAMINE 250 UG/ML
INJECTION, SOLUTION INTRAMUSCULAR
Status: DISPENSED
Start: 2023-07-17 | End: 2023-07-18

## 2023-07-17 RX ORDER — OXYTOCIN/RINGER'S LACTATE 30/500 ML
62.5 PLASTIC BAG, INJECTION (ML) INTRAVENOUS CONTINUOUS
Status: DISCONTINUED | OUTPATIENT
Start: 2023-07-17 | End: 2023-07-18

## 2023-07-17 RX ORDER — LIDOCAINE HYDROCHLORIDE 10 MG/ML
10 INJECTION, SOLUTION EPIDURAL; INFILTRATION; INTRACAUDAL; PERINEURAL ONCE
Status: COMPLETED | OUTPATIENT
Start: 2023-07-17 | End: 2023-07-17

## 2023-07-17 RX ORDER — MORPHINE SULFATE 4 MG/ML
4 INJECTION, SOLUTION INTRAMUSCULAR; INTRAVENOUS ONCE
Status: COMPLETED | OUTPATIENT
Start: 2023-07-17 | End: 2023-07-17

## 2023-07-17 RX ORDER — FENTANYL CITRATE 50 UG/ML
INJECTION, SOLUTION INTRAMUSCULAR; INTRAVENOUS AS NEEDED
Status: DISCONTINUED | OUTPATIENT
Start: 2023-07-17 | End: 2023-07-24 | Stop reason: HOSPADM

## 2023-07-17 RX ORDER — TERBUTALINE SULFATE 1 MG/ML
INJECTION, SOLUTION SUBCUTANEOUS
Status: COMPLETED
Start: 2023-07-17 | End: 2023-07-17

## 2023-07-17 RX ORDER — DIAPER,BRIEF,INFANT-TODD,DISP
EACH MISCELLANEOUS 4 TIMES DAILY PRN
Status: DISCONTINUED | OUTPATIENT
Start: 2023-07-17 | End: 2023-07-19 | Stop reason: HOSPADM

## 2023-07-17 RX ORDER — TERBUTALINE SULFATE 1 MG/ML
0.25 INJECTION, SOLUTION SUBCUTANEOUS ONCE
Status: COMPLETED | OUTPATIENT
Start: 2023-07-17 | End: 2023-07-17

## 2023-07-17 RX ORDER — LIDOCAINE HYDROCHLORIDE AND EPINEPHRINE 15; 5 MG/ML; UG/ML
INJECTION, SOLUTION EPIDURAL AS NEEDED
Status: DISCONTINUED | OUTPATIENT
Start: 2023-07-17 | End: 2023-07-24 | Stop reason: HOSPADM

## 2023-07-17 RX ORDER — ONDANSETRON 2 MG/ML
4 INJECTION INTRAMUSCULAR; INTRAVENOUS EVERY 8 HOURS PRN
Status: DISCONTINUED | OUTPATIENT
Start: 2023-07-17 | End: 2023-07-19 | Stop reason: HOSPADM

## 2023-07-17 RX ORDER — KETOROLAC TROMETHAMINE 30 MG/ML
15 INJECTION, SOLUTION INTRAMUSCULAR; INTRAVENOUS EVERY 6 HOURS SCHEDULED
Status: DISPENSED | OUTPATIENT
Start: 2023-07-18 | End: 2023-07-18

## 2023-07-17 RX ORDER — LIDOCAINE HYDROCHLORIDE AND EPINEPHRINE 20; 5 MG/ML; UG/ML
INJECTION, SOLUTION EPIDURAL; INFILTRATION; INTRACAUDAL; PERINEURAL AS NEEDED
Status: DISCONTINUED | OUTPATIENT
Start: 2023-07-17 | End: 2023-07-24 | Stop reason: HOSPADM

## 2023-07-17 RX ORDER — SENNOSIDES 8.6 MG
1 TABLET ORAL DAILY
Status: DISCONTINUED | OUTPATIENT
Start: 2023-07-18 | End: 2023-07-19 | Stop reason: HOSPADM

## 2023-07-17 RX ORDER — OXYTOCIN/RINGER'S LACTATE 30/500 ML
250 PLASTIC BAG, INJECTION (ML) INTRAVENOUS ONCE
Status: DISCONTINUED | OUTPATIENT
Start: 2023-07-17 | End: 2023-07-18

## 2023-07-17 RX ORDER — METHYLERGONOVINE MALEATE 0.2 MG/ML
0.2 INJECTION INTRAVENOUS ONCE
Status: COMPLETED | OUTPATIENT
Start: 2023-07-17 | End: 2023-07-17

## 2023-07-17 RX ORDER — ACETAMINOPHEN 325 MG/1
650 TABLET ORAL EVERY 4 HOURS PRN
Status: DISCONTINUED | OUTPATIENT
Start: 2023-07-17 | End: 2023-07-19 | Stop reason: HOSPADM

## 2023-07-17 RX ORDER — CALCIUM CARBONATE 500 MG/1
1000 TABLET, CHEWABLE ORAL 3 TIMES DAILY PRN
Status: DISCONTINUED | OUTPATIENT
Start: 2023-07-17 | End: 2023-07-19 | Stop reason: HOSPADM

## 2023-07-17 RX ORDER — LIDOCAINE HYDROCHLORIDE 10 MG/ML
INJECTION, SOLUTION EPIDURAL; INFILTRATION; INTRACAUDAL; PERINEURAL
Status: COMPLETED
Start: 2023-07-17 | End: 2023-07-17

## 2023-07-17 RX ORDER — TERBUTALINE SULFATE 1 MG/ML
INJECTION, SOLUTION SUBCUTANEOUS
Status: DISPENSED
Start: 2023-07-17 | End: 2023-07-18

## 2023-07-17 RX ORDER — DOCUSATE SODIUM 100 MG/1
100 CAPSULE, LIQUID FILLED ORAL 2 TIMES DAILY
Status: DISCONTINUED | OUTPATIENT
Start: 2023-07-17 | End: 2023-07-19 | Stop reason: HOSPADM

## 2023-07-17 RX ADMIN — HYDROCORTISONE: 1 CREAM TOPICAL at 20:15

## 2023-07-17 RX ADMIN — IBUPROFEN 600 MG: 600 TABLET, FILM COATED ORAL at 22:00

## 2023-07-17 RX ADMIN — PENICILLIN G POTASSIUM 2.5 MILLION UNITS: 20000000 INJECTION, POWDER, FOR SOLUTION INTRAVENOUS at 02:34

## 2023-07-17 RX ADMIN — PENICILLIN G POTASSIUM 2.5 MILLION UNITS: 20000000 INJECTION, POWDER, FOR SOLUTION INTRAVENOUS at 15:11

## 2023-07-17 RX ADMIN — SODIUM CHLORIDE, SODIUM LACTATE, POTASSIUM CHLORIDE, AND CALCIUM CHLORIDE 999 ML/HR: .6; .31; .03; .02 INJECTION, SOLUTION INTRAVENOUS at 00:41

## 2023-07-17 RX ADMIN — METHYLERGONOVINE MALEATE 0.2 MG: 0.2 INJECTION INTRAVENOUS at 23:05

## 2023-07-17 RX ADMIN — DOCUSATE SODIUM 100 MG: 100 CAPSULE, LIQUID FILLED ORAL at 18:31

## 2023-07-17 RX ADMIN — Medication 62.5 MILLI-UNITS/MIN: at 23:35

## 2023-07-17 RX ADMIN — PENICILLIN G POTASSIUM 2.5 MILLION UNITS: 20000000 INJECTION, POWDER, FOR SOLUTION INTRAVENOUS at 10:27

## 2023-07-17 RX ADMIN — LIDOCAINE HYDROCHLORIDE AND EPINEPHRINE 3 ML: 20; 5 INJECTION, SOLUTION EPIDURAL; INFILTRATION; INTRACAUDAL; PERINEURAL at 14:17

## 2023-07-17 RX ADMIN — TERBUTALINE SULFATE 1 MG: 1 INJECTION, SOLUTION SUBCUTANEOUS at 02:26

## 2023-07-17 RX ADMIN — SODIUM CHLORIDE, SODIUM LACTATE, POTASSIUM CHLORIDE, AND CALCIUM CHLORIDE 125 ML/HR: .6; .31; .03; .02 INJECTION, SOLUTION INTRAVENOUS at 10:24

## 2023-07-17 RX ADMIN — LIDOCAINE HYDROCHLORIDE AND EPINEPHRINE 3 ML: 15; 5 INJECTION, SOLUTION EPIDURAL at 00:52

## 2023-07-17 RX ADMIN — METHYLERGONOVINE MALEATE 0.2 MG: 0.2 INJECTION, SOLUTION INTRAMUSCULAR; INTRAVENOUS at 23:05

## 2023-07-17 RX ADMIN — LIDOCAINE HYDROCHLORIDE AND EPINEPHRINE 2 ML: 15; 5 INJECTION, SOLUTION EPIDURAL at 00:55

## 2023-07-17 RX ADMIN — LIDOCAINE HYDROCHLORIDE 10 ML: 10 INJECTION, SOLUTION EPIDURAL; INFILTRATION; INTRACAUDAL; PERINEURAL at 18:23

## 2023-07-17 RX ADMIN — MORPHINE SULFATE 4 MG: 4 INJECTION INTRAVENOUS at 23:20

## 2023-07-17 RX ADMIN — SODIUM CHLORIDE, SODIUM LACTATE, POTASSIUM CHLORIDE, AND CALCIUM CHLORIDE 999 ML/HR: .6; .31; .03; .02 INJECTION, SOLUTION INTRAVENOUS at 13:18

## 2023-07-17 RX ADMIN — ROPIVACAINE HYDROCHLORIDE: 2 INJECTION, SOLUTION EPIDURAL; INFILTRATION at 09:20

## 2023-07-17 RX ADMIN — IBUPROFEN 600 MG: 600 TABLET, FILM COATED ORAL at 18:31

## 2023-07-17 RX ADMIN — ROPIVACAINE HYDROCHLORIDE 5 ML: 2 INJECTION, SOLUTION EPIDURAL; INFILTRATION at 00:57

## 2023-07-17 RX ADMIN — SODIUM CHLORIDE, SODIUM LACTATE, POTASSIUM CHLORIDE, AND CALCIUM CHLORIDE 125 ML/HR: .6; .31; .03; .02 INJECTION, SOLUTION INTRAVENOUS at 15:11

## 2023-07-17 RX ADMIN — ROPIVACAINE HYDROCHLORIDE: 2 INJECTION, SOLUTION EPIDURAL; INFILTRATION at 01:00

## 2023-07-17 RX ADMIN — SODIUM CHLORIDE, SODIUM LACTATE, POTASSIUM CHLORIDE, AND CALCIUM CHLORIDE 125 ML/HR: .6; .31; .03; .02 INJECTION, SOLUTION INTRAVENOUS at 01:37

## 2023-07-17 RX ADMIN — SODIUM CHLORIDE, SODIUM LACTATE, POTASSIUM CHLORIDE, AND CALCIUM CHLORIDE 125 ML/HR: .6; .31; .03; .02 INJECTION, SOLUTION INTRAVENOUS at 08:03

## 2023-07-17 RX ADMIN — ROPIVACAINE HYDROCHLORIDE: 2 INJECTION, SOLUTION EPIDURAL; INFILTRATION at 15:59

## 2023-07-17 RX ADMIN — BENZOCAINE AND LEVOMENTHOL: 200; 5 SPRAY TOPICAL at 20:15

## 2023-07-17 RX ADMIN — ACETAMINOPHEN 650 MG: 325 TABLET, FILM COATED ORAL at 20:15

## 2023-07-17 RX ADMIN — PENICILLIN G POTASSIUM 2.5 MILLION UNITS: 20000000 INJECTION, POWDER, FOR SOLUTION INTRAVENOUS at 06:25

## 2023-07-17 RX ADMIN — FENTANYL CITRATE 100 MCG: 50 INJECTION, SOLUTION INTRAMUSCULAR; INTRAVENOUS at 14:17

## 2023-07-17 NOTE — OB LABOR/OXYTOCIN SAFETY PROGRESS
Oxytocin Safety Progress Check Note - Bruno Oakley 28 y.o. female MRN: [id number]    Unit/Bed#: -01 Encounter: [id number]    Dose (nina-units/min) Oxytocin: 0 nina-units/min (Held Per. Dr. Yas Haywood)  Contraction Frequency (minutes): 3  Contraction Quality: Moderate  Tachysystole: No   Cervical Dilation: 5        Cervical Effacement: 80  Fetal Station: -1  Baseline Rate: 135 bpm  Fetal Heart Rate: 138 BPM  FHR Category: Category I  Oxytocin Safety Progress Check: Safety check completed            Vital Signs:   Vitals:    07/17/23 1400   BP: 107/72   Pulse:    Resp:    Temp:    SpO2:        Notes/comments: Patient reassessed for increasing pressure. Pitocin held at the moment. FHT Category I. SVE as above. Will reassess in 2 hours or sooner if clinically indicated.          Miley Wong MD 7/17/2023 2:28 PM

## 2023-07-17 NOTE — OB LABOR/OXYTOCIN SAFETY PROGRESS
Oxytocin Safety Progress Check Note - Abbey Hinojosa 28 y.o. female MRN: [id number]    Unit/Bed#: -01 Encounter: [id number]    Dose (nina-units/min) Oxytocin: 12 nina-units/min  Contraction Frequency (minutes): CEDRICK  Contraction Quality: Moderate  Tachysystole: No   Cervical Dilation: 4        Cervical Effacement: 80  Fetal Station: -2  Baseline Rate: 130 bpm  Fetal Heart Rate: 130 BPM  FHR Category: Category I  Oxytocin Safety Progress Check: Safety check completed            Vital Signs:   Vitals:    07/17/23 1045   BP: 106/63   Pulse:    Resp:    Temp:    SpO2:        Notes/comments: AROM for clear fluid. SVE as above. FHT Cat I. Will reassess in 2 hours or sooner if clinically indicated. Dr. Vadim Soares aware.          Jose A Hickey MD 7/17/2023 10:58 AM

## 2023-07-17 NOTE — L&D DELIVERY NOTE
Vaginal Delivery Summary - OB/GYN   Petar Arenas 28 y.o. female MRN: [id number]  Unit/Bed#:  204-01 Encounter: [id number]      Pre-delivery Diagnosis:   1. Pregnancy at 37w6d  2. Gestation hypertension  3. Hx GBS infection  4. Cervical polyp  5. Obesity in pregnancy  6. Anemia in pregnancy    Post-delivery Diagnosis: delivered, PPH    Procedure: Spontaneous Vaginal Delivery     Attending: Dr. Kay Glover    Assistant(s): Dr. Zelalem Aguillon    Anesthesia: Epidural    QBL: 5139 mL    Complications: Post partum hemorrhage     Specimens:   1. Arterial and venous cord gases  2. Cord blood  3. Segment of umbilical cord  4. Placenta to storage     Findings:  1. Viable male on 2023 at 1730, with APGARS of 8 and 9 at 1 and 5 minutes respectively  2. Spontaneous delivery of intact placenta at 1735  3. 1 degree laceration repaired with 3-0 vicryl   4. Bilateral labial lacerations repaired with 4-0 vicryl   5. Blood gases:   Arterial pH: 7.170   Arterial base excess: -8.8   Venous pH: 7.294   Venous base excess: -6.3    Disposition:  Patient tolerated the procedure well and was recovering in labor and delivery room       Brief history and labor course:  Ms. Petar Arenas is a 28 y.o.  at 38w9d. She presented to labor and delivery for contractions and loss of fluid. Her pregnancy was complicated by anemia and obesity. She received a diagnosis of gHTN intrapartum. On exam in triage she was noted to be closed/thick/high. She was admitted for category II tracing with late decelerations. She was induced with reyna balloon and pitocin. Amniotomy was performed for clear fluid. Epidural was placed for maternal analgesia. Patient experienced late decelerations secondary to hypotension s/p epidural placement. She had intermittent deep late decelerations that were responsive to resuscitation. Patient continued to have late and variable decelerations during a period of tachysystole while pitocin was off. Terbutaline was given.  An [facility] was placed for more accurate fetal monitoring and pitocin titration. At the time of placement, meconium stained fluid was noted. FHT category I for remainder of labor. Patient progressed to complete. Description of procedure:  After pushing for 39 minutes, at 1730 patient delivered a viable male , wt pending, apgars of 8 (1 min) and 9 (5 min). The fetal vertex delivered spontaneously. There was no nuchal cord. The left anterior shoulder delivered atraumatically with maternal expulsive forces and the assistance of gentle downward traction. The right posterior shoulder delivered with maternal expulsive forces and the assistance of gentle upward traction. The remainder of the fetus delivered spontaneously. Upon delivery, the infant was placed on the mothers abdomen and the cord was clamped and cut after delayed cord clamping. The infant was noted to cry spontaneously and was moving all extremities appropriately. There was no evidence for injury. Awaiting nurse resuscitators evaluated the . Arterial and venous cord blood gases and cord blood was collected for analysis. These were promptly sent to the lab. In the immediate post-partum, 30 units of IV pitocin was administered, and the uterus was noted to contract down well with massage and pitocin. The placenta delivered spontaneously at 1735 and was noted to have a marginally inserted 3 vessel cord. The vagina, cervix, perineum, and rectum were inspected and there was noted to be a 1 degree laceration and bilateral labial lacerations. Laceration Repair  Patient was comfortable with epidural at that time. A 1 degree perineal laceration was identified and required repair. Laceration was repaired with 3-0 Vicryl in running, locked fashion to reapproximate the laceration. Bilateral labial lacerations were also identified and repaired with 4-0 Vicryl in a running fashion.  Good hemostasis was confirmed at the conclusion of this procedure. Bimanual exam revealed minimal clots and good uterine tone. The fundus was firm and at the level of the umbilicus. At the conclusion of the procedure, all needle, sponge, and instrument counts were noted to be correct. Patient tolerated the procedure well. and was allowed to recover in labor and delivery room with family and  before being transferred to the post-partum floor. Dr. Bora Barry was present and participated in all key portions of the case.       Felipa Mccann MD   OB/GYN PGY-1  2023  6:44 PM

## 2023-07-17 NOTE — OB LABOR/OXYTOCIN SAFETY PROGRESS
Oxytocin Safety Progress Check Note - Burke Decsalomón 28 y.o. female MRN: [id number]    Unit/Bed#: -01 Encounter: [id number]    Dose (nina-units/min) Oxytocin: 2 nina-units/min  Contraction Frequency (minutes): 2-3  Contraction Quality: Mild  Tachysystole: No   Cervical Dilation: 3        Cervical Effacement: 50  Fetal Station: -3  Baseline Rate: 125 bpm  Fetal Heart Rate: 166 BPM  FHR Category: Category I               Vital Signs:   Vitals:    07/16/23 2345   BP: 136/93   Pulse: 100   Resp:    Temp:        Notes/comments:   Patient expelled Lares balloon. SVE 3/50/-3. Patient getting more uncomfortable with contractions and requesting epidural.  FHT category 1. Plan to continue Pitocin titration. Dr. Fco Persaud aware.      Princess Gambino MD 7/17/2023 12:22 AM

## 2023-07-17 NOTE — ANESTHESIA PROCEDURE NOTES
Epidural Block    Patient location during procedure: OB  Start time: 7/17/2023 12:50 AM  Reason for block: procedure for pain and at surgeon's request  Staffing  Performed by: Sugar Black CRNA  Authorized by: Kota Celis MD    Preanesthetic Checklist  Completed: patient identified, IV checked, risks and benefits discussed, surgical consent, monitors and equipment checked, pre-op evaluation and timeout performed  Epidural  Patient position: sitting  Prep: ChloraPrep  Patient monitoring: cardiac monitor and frequent blood pressure checks  Approach: midline  Location: lumbar  Injection technique: CECILLE saline  Needle  Needle type: Tuohy   Needle gauge: 17 G  Catheter type: side hole  Catheter size: 19 G  Catheter at skin depth: 14 cm  Catheter securement method: stabilization device  Test dose: negative  Assessment  Number of attempts: 1negative aspiration for CSF, negative aspiration for heme and no paresthesia on injection  patient tolerated the procedure well with no immediate complications  Additional Notes  Easy placement, CECILLE at 7cm, catheter threaded easily to 20 and pulled back and taped at 14

## 2023-07-17 NOTE — OB LABOR/OXYTOCIN SAFETY PROGRESS
Oxytocin Safety Progress Check Note - Radha Gilliam 28 y.o. female MRN: [id number]    Unit/Bed#: -01 Encounter: [id number]    Dose (nina-units/min) Oxytocin: 8 nina-units/min  Contraction Frequency (minutes): 2-4  Contraction Quality: Moderate  Tachysystole: No   Cervical Dilation: 3-4        Cervical Effacement: 80  Fetal Station: -2  Baseline Rate: 125 bpm  Fetal Heart Rate: 120 BPM  FHR Category: Category I  Oxytocin Safety Progress Check: Safety check completed            Vital Signs:   Vitals:    07/17/23 0845   BP: 108/68   Pulse:    Resp:    Temp:    SpO2:        Notes/comments: Patient reassessed. Cervical exam as above. Pitocin at 8mU/min. FHT Cat I. Will notify Dr. Raine Coe.          Jia Gray MD 7/17/2023 9:14 AM

## 2023-07-17 NOTE — OB LABOR/OXYTOCIN SAFETY PROGRESS
Oxytocin Safety Progress Check Note - Bryce Fry 28 y.o. female MRN: [id number]    Unit/Bed#: -01 Encounter: [id number]    Dose (nina-units/min) Oxytocin: 14 nina-units/min  Contraction Frequency (minutes): 1.5-2  Contraction Quality: Moderate  Tachysystole: No   Cervical Dilation: 4        Cervical Effacement: 80  Fetal Station: -2  Baseline Rate: 130 bpm  Fetal Heart Rate: 122 BPM  FHR Category: Category II  Oxytocin Safety Progress Check: Safety check completed            Vital Signs:   Vitals:    07/17/23 1100   BP: 94/63   Pulse:    Resp:    Temp: 97.8 °F (36.6 °C)   SpO2:        Notes/comments: SVE as above. Contractions difficult to monitor. IUPC placed. Meconium fluid noted. Pitocin currently at 14.  Plan to continue to monitor FHT and continue pitocin titration        Gabbie Pradhan MD 7/17/2023 11:36 AM

## 2023-07-17 NOTE — OB LABOR/OXYTOCIN SAFETY PROGRESS
Oxytocin Safety Progress Check Note - Petar Arenas 28 y.o. female MRN: [id number]    Unit/Bed#: -01 Encounter: [id number]    Dose (nina-units/min) Oxytocin: 6 nina-units/min  Contraction Frequency (minutes): 2-5  Contraction Quality: Moderate  Tachysystole: No   Cervical Dilation: 3-4        Cervical Effacement: 80  Fetal Station: -3  Baseline Rate: 125 bpm  Fetal Heart Rate: 122 BPM  FHR Category: Category I  Oxytocin Safety Progress Check: Safety check completed            Vital Signs:   Vitals:    07/17/23 0550   BP: 130/81   Pulse: 101   Resp:    Temp:    SpO2:        Notes/comments:   SVE as above. FHT category 1. Continue with pitocin titration.          Arline Meyer MD 7/17/2023 6:05 AM

## 2023-07-17 NOTE — ASSESSMENT & PLAN NOTE
Lochia WNL   Recovering well   Appropriate bowel and bladder function   Pain well controlled   Tolerating diet   Breastfeeding: plans to but bottlefed last night   Ambulating without issues   No lower extremity tenderness  GBS positive  Rh positive   Right calf tenderness: consider Doppler US  Contraception: micronor

## 2023-07-17 NOTE — OB LABOR/OXYTOCIN SAFETY PROGRESS
Oxytocin Safety Progress Check Note - Greg Leroy 28 y.o. female MRN: [id number]    Unit/Bed#: -01 Encounter: [id number]    Dose (nina-units/min) Oxytocin: 0 nina-units/min (decels)  Contraction Frequency (minutes): 1-5  Contraction Quality: Strong  Tachysystole: No   Cervical Dilation: 3        Cervical Effacement: 50  Fetal Station: -3  Baseline Rate: 130 bpm  Fetal Heart Rate: 122 BPM  FHR Category: Category II               Vital Signs:   Vitals:    23 0205   BP: 118/69   Pulse: 85   Resp:    Temp:    SpO2:        Notes/comments:   FHT notable for run of lates/variables on the monitor. Patient noted to have contractions q1. Contractions palpating strong. Terbutaline given as pitocin is not running. No vaginal bleeding noted. Again, patient repositioned and still receiving IVF. D/w patient that she is 3cm. If we cannot get her to contract due to fetal intolerance to get to complete dilation, we may have to consider alternative route of delivery via . Tracing now resolved with baseline change to 140. No further contractions palpated. Will continue to monitor closely.  D/w Dr. Gavi Vuong MD 2023 2:34 AM

## 2023-07-17 NOTE — H&P
H & P- Obstetrics   Ana Lilia Medrano 28 y.o. female MRN: [id number]  Unit/Bed#: -01 Encounter: [id number]    Assessment: 28 y.o.  at 37w5d admitted for IOL 2/2 NRFHT at term. SVE: closed/thick/high  FHT: baseline 130, moderate variability, acels present, intermittent late decels, category II  Clinical EFW: 2829g (68%tile @ 35w2d) ; Cephalic confirmed by TAUS  GBS status: positive   Postpartum contraception plan: wants further discussion on options with plans to breastfeed baby    Plan:   * Non-reassuring fetal heart rate with late deceleration  Assessment & Plan  Patient monitoring in triage noted for late decelerations  Overall moderate variability, however due to decelerations at term, plan to admit patient for an induction of labor  See 37w gestation for plan    37 weeks gestation of pregnancy  Assessment & Plan  Admit to OBGYN   Clear liquid diet   F/u T&S, CBC, RPR   IVF LR 125cc/hr   Continuous fetal monitoring and tocometry   Analgesia at maternal request   Vertex by TAUS  Induction plan: FB and pitocin       GBS (group B streptococcus) infection  Assessment & Plan  PCN ordered    Cervical polyp  Assessment & Plan  2 cervical polyps noted during prenatal appointments  No hx of bleeding    Obesity in pregnancy, antepartum  Assessment & Plan  BMI 33    Anemia during pregnancy in third trimester  Assessment & Plan  On po iron supplementation         Discussed case and plan w/ Dr. Waqas Lemos      Chief Complaint: contractions    HPI: Ana Lilia Medrano is a 28 y.o.  with an NICOLE of 2023, by Last Menstrual Period at 37w5d who presented to triage for contractions and the sensation of losing fluid. Her labor and rupture workups were negative, but FHT was significant for intermittent late decelerations. Thus, she is being admitted for induction of labor for NRFHT at term. She complains of uterine contractions, occurring every 5-10 minutes, has no LOF, and reports no VB.  She states she has felt good FM.    Her obstetric history is significant for anemia for which she takes po iron supplementation. She also has a history of GBS infection and 2 cervical polyps, which have not been a problem this pregnancy. Patient Active Problem List   Diagnosis   • Anemia during pregnancy in third trimester   • 37 weeks gestation of pregnancy   • Obesity in pregnancy, antepartum   • Cervical polyp   • History of palpitations   • Dizziness   • Language barrier   • GBS (group B streptococcus) infection   • Non-reassuring fetal heart rate with late deceleration       Baby complications/comments: none    Review of Systems   Constitutional: Negative for chills and fever. HENT: Negative for congestion, facial swelling, rhinorrhea and sore throat. Eyes: Negative for photophobia and visual disturbance. Respiratory: Negative for chest tightness and shortness of breath. Cardiovascular: Negative for chest pain and leg swelling. Gastrointestinal: Negative for nausea and vomiting. Endocrine: Positive for polyuria. Genitourinary: Positive for dysuria and pelvic pain. Negative for vaginal bleeding and vaginal pain. Musculoskeletal: Positive for back pain. Lower back pain with contractions   Neurological: Negative for dizziness, numbness and headaches. Psychiatric/Behavioral: Negative. OB Hx:  OB History    Para Term  AB Living   1 0 0 0 0 0   SAB IAB Ectopic Multiple Live Births   0 0 0 0 0      # Outcome Date GA Lbr Froylan/2nd Weight Sex Delivery Anes PTL Lv   1 Current                Past Medical Hx:  Past Medical History:   Diagnosis Date   • Anemia    • Migraines    • Polycystic ovary syndrome        Past Surgical hx:  History reviewed. No pertinent surgical history.     Social Hx:  Alcohol use: rarely prior to pregnancy, none in pregnancy   Tobacco use: denies  Other substance use: denies      No Known Allergies    Medications Prior to Admission   Medication   • ferrous sulfate 324 (65 Fe) mg   • Prenatal Vit-Fe Fumarate-FA ( Prenatal Vitamins) 28-0.8 MG TABS   • aspirin 81 mg chewable tablet   • doxylamine (UNISOM) 25 MG tablet   • lidocaine (Lidoderm) 5 %   • ondansetron (ZOFRAN) 4 mg tablet   • Pyridoxine HCl (vitamin B-6) 25 MG tablet       Objective:  Temp:  [98.1 °F (36.7 °C)] 98.1 °F (36.7 °C)  HR:  [91] 91  Resp:  [16] 16  BP: (120-124)/(82-83) 124/83  Body mass index is 33.13 kg/m². Physical Exam:  Physical Exam  Constitutional:       General: She is not in acute distress. Appearance: Normal appearance. She is not ill-appearing. Genitourinary:      Genitourinary Comments: SVE closed, thick, high  Speculum: negative for pooling, negative nitrazine, negative ferning, negative for other vaginal infections    HENT:      Head: Normocephalic and atraumatic. Mouth/Throat:      Mouth: Mucous membranes are moist. No oral lesions. Eyes:      General: No scleral icterus. Extraocular Movements: Extraocular movements intact. Cardiovascular:      Rate and Rhythm: Normal rate. Abdominal:      General: There is no distension. Palpations: Abdomen is soft. Tenderness: There is no abdominal tenderness. There is no guarding. Comments: gravid   Musculoskeletal:      Right lower leg: No edema. Left lower leg: No edema. Neurological:      General: No focal deficit present. Mental Status: She is alert. Skin:     General: Skin is warm and dry. Psychiatric:         Attention and Perception: Attention normal.         Mood and Affect: Mood normal.         Speech: Speech normal.         Behavior: Behavior normal.         Thought Content: Thought content normal.         Judgment: Judgment normal.   Vitals and nursing note reviewed. Exam conducted with a chaperone present.             FHT:  Baseline Rate: 130 bpm  Variability: Moderate 6-25 bpm  Accelerations: 15 x 15 or greater  Decelerations: None  FHR Category: Category I    TOCO:   Contraction Frequency (minutes): 2-3  Contraction Duration (seconds): 60-90  Contraction Quality: Mild    Lab Results   Component Value Date    WBC 15.45 (H) 06/07/2023    HGB 10.3 (L) 06/07/2023    HCT 35.8 06/07/2023     06/07/2023     Lab Results   Component Value Date    K 3.9 05/04/2023     (H) 05/04/2023    CO2 22 05/04/2023    BUN 5 05/04/2023    CREATININE 0.42 (L) 05/04/2023    AST 8 05/04/2023    ALT 14 05/04/2023     Prenatal Labs: Reviewed      Blood type: O pos  Antibody: negative  GBS: positive   HIV: non-reactive   Rubella: immune  Syphilis IgM/IgG: negative, admission test pending  HBsAg: non-reactive  HCAb: non-reactive   Chlamydia: negative  Gonorrhea: negative   Diabetes 1 hour screen: 97 mg/dL  Platelets: 654 on 6/7, admission CBC pending  Hgb: 10.3 on 6/7, admission CBC pending   >2 Midnights  INPATIENT     Signature/Title: Alexandria Gay MD  Date: 7/16/2023  Time: 10:49 PM

## 2023-07-17 NOTE — OB LABOR/OXYTOCIN SAFETY PROGRESS
Oxytocin Safety Progress Check Note - Rosalba Seats 28 y.o. female MRN: [id number]    Unit/Bed#: -01 Encounter: [id number]    Dose (nina-units/min) Oxytocin: 2 nina-units/min  Contraction Frequency (minutes): 2-3  Contraction Quality: Mild  Tachysystole: No   Cervical Dilation: 3        Cervical Effacement: 50  Fetal Station: -3  Baseline Rate: 120 bpm  Fetal Heart Rate: 122 BPM  FHR Category: Category I               Vital Signs:   Vitals:    07/17/23 0102   BP: 114/67   Pulse: 104   Resp:    Temp:        Notes/comments:   Patient having late decelerations s/p epidural placement. BPs soft after epidural.  Patient receiving fluid bolus, turned on her other side, and pitocin was stopped. FHT improved. Dr. Akbar Tarango present at bedside. Dr. Edelmira Devlin aware.      Elizabeth Jensen MD 7/17/2023 1:36 AM

## 2023-07-17 NOTE — OB LABOR/OXYTOCIN SAFETY PROGRESS
Labor Progress Note - Kat Patricia 28 y.o. female MRN: [id number]    Unit/Bed#: -01 Encounter: [id number]    Dose (nina-units/min) Oxytocin: 0 nina-units/min (Held Per. Dr. Bill Conner)  Contraction Frequency (minutes): 2.5-3.5  Contraction Quality: Strong  Tachysystole: No   Cervical Dilation: 9        Cervical Effacement: 100  Fetal Station: 0  Baseline Rate: 150 bpm  Fetal Heart Rate: 152 BPM  FHR Category: Category I  Oxytocin Safety Progress Check: Safety check completed            Vital Signs:   Vitals:    07/17/23 1615   BP: 111/72   Pulse: 92   Resp:    Temp:    SpO2:        Notes/comments: Patient feeling more pressure. SVE as above. FHT Category I. Will reassess in 2 hours or sooner if clinically indicated.          Nelson Mendenhall MD 7/17/2023 4:25 PM

## 2023-07-17 NOTE — OB LABOR/OXYTOCIN SAFETY PROGRESS
Oxytocin Safety Progress Check Note - [place] Patient 28 y.o. female MRN: [id number]    Unit/Bed#: -01 Encounter: [id number]    Dose (nina-units/min) Oxytocin: 10 nina-units/min  Contraction Frequency (minutes): 2-5  Contraction Quality: Moderate  Tachysystole: No   Cervical Dilation: 3-4        Cervical Effacement: 80  Fetal Station: -3  Baseline Rate: 125 bpm  Fetal Heart Rate: 122 BPM  FHR Category: Category I  Oxytocin Safety Progress Check: Safety check completed            Vital Signs:   Vitals:    07/17/23 0635   BP: 109/67   Pulse: 92   Resp:    Temp:    SpO2:        Notes/comments:   lates noted on monitoring, pitocin turned down to 6 from 10.          Christianne uYng MD 7/17/2023 6:48 AM

## 2023-07-17 NOTE — OB LABOR/OXYTOCIN SAFETY PROGRESS
Oxytocin Safety Progress Check Note - Cory Valdez 28 y.o. female MRN: [id number]    Unit/Bed#: -01 Encounter: [id number]    Dose (nina-units/min) Oxytocin: 14 nian-units/min  Contraction Frequency (minutes): 1.5-2  Contraction Quality: Moderate  Tachysystole: No   Cervical Dilation: 4        Cervical Effacement: 80  Fetal Station: -2  Baseline Rate: 130 bpm  Fetal Heart Rate: 122 BPM  FHR Category: Category II  Oxytocin Safety Progress Check: Safety check completed            Vital Signs:   Vitals:    07/17/23 1145   BP: 117/79   Pulse:    Resp:    Temp:    SpO2:        Notes/comments: 5 minute deceleration with a nydia of 70 requiring repositioning and discontinuation of pitocin which was previously at 14mU/min. Contractions q1-2 min. FSE was placed and FHR returned to baseline of 130 with moderate variability. Plan to reassess in 2 hours or sooner if clinically indicated.          Carin Landa MD 7/17/2023 12:14 PM

## 2023-07-17 NOTE — ANESTHESIA PREPROCEDURE EVALUATION
Procedure:  LABOR ANALGESIA    Relevant Problems   GYN   (+) 37 weeks gestation of pregnancy      HEMATOLOGY   (+) Anemia during pregnancy in third trimester        Physical Exam    Airway    Mallampati score: II  TM Distance: >3 FB  Neck ROM: full     Dental   No notable dental hx     Cardiovascular      Pulmonary      Other Findings        Anesthesia Plan  ASA Score- 2     Anesthesia Type- epidural with ASA Monitors. Additional Monitors:   Airway Plan:     Comment: Patient examined, history reviewed. Labor epidural explained, risks and benefits discussed. Consent has been signed. .       Plan Factors-Exercise tolerance (METS): >4 METS. Chart reviewed. Existing labs reviewed. Patient summary reviewed. Induction-     Postoperative Plan-     Informed Consent- Anesthetic plan and risks discussed with patient. I personally reviewed this patient with the CRNA. Discussed and agreed on the Anesthesia Plan with the CRNA. Madeleine Fry

## 2023-07-17 NOTE — OB LABOR/OXYTOCIN SAFETY PROGRESS
Oxytocin Safety Progress Check Note - Dot Johnathan 28 y.o. female MRN: [id number]    Unit/Bed#: -01 Encounter: [id number]       Contraction Frequency (minutes): 5-10  Contraction Quality: Mild  Tachysystole: No   Cervical Dilation: Closed        Cervical Effacement: 0  Fetal Station: Ballotable  Baseline Rate: 135 bpm  Fetal Heart Rate: 166 BPM  FHR Category: Category I               Vital Signs:   Vitals:    07/16/23 2149   BP: 124/83   Pulse: 91   Resp: 16   Temp: 98.1 °F (36.7 °C)       Notes/comments:   Patient settled in labor room from triage. Patient is ivania more on the monitor now, so SVE rechecked, 1/60/-3 by Dr. Yeyo Hatfield. Torres balloon placed. Patient tolerated the procedure well. After torres placement, some blood return, but no active hemorrhage. Will continue to monitor. Dr. Bj Donnelly aware. Dr. Yeyo Hatfield present for examination and torres balloon placement. PROCEDURE:  TORRES BALLOON PLACEMENT    A 24F torres with a 30cc balloon was selected. SVE was performed and cervix was located. Torres was introduced with sterile ringed forcept. Balloon advanced through cervix beyond the internal cervical os. A small amount amount of sterile saline solution was instilled in the balloon to confirm placement. Placement was confirmed to be beyond the internal cervical os. A total of 60cc of sterile saline solution was placed into the balloon. Pt tolerated well. Instructions left with RN to place torres to gravity with a 1L bag of IV fluid. Notify MD when torres dislodged.      Severa Snare, MD  OB/GYN PGY-1  7/16/2023  10:32 PM

## 2023-07-17 NOTE — OB LABOR/OXYTOCIN SAFETY PROGRESS
Oxytocin Safety Progress Check Note - Naveen Rosen 28 y.o. female MRN: [id number]    Unit/Bed#: -01 Encounter: [id number]    Dose (nina-units/min) Oxytocin: 0 nina-units/min (Held Per. Dr. Eva Skaggs)  Contraction Frequency (minutes): 3  Contraction Quality: Moderate  Tachysystole: No   Cervical Dilation: 8        Cervical Effacement: 100  Fetal Station: 0  Baseline Rate: 135 bpm  Fetal Heart Rate: 131 BPM  FHR Category: Category I  Oxytocin Safety Progress Check: Safety check completed            Vital Signs:   Vitals:    07/17/23 1530   BP: 110/62   Pulse:    Resp:    Temp: 98.6 °F (37 °C)   SpO2:        Notes/comments: Patient feeling more pressure, now 8/100/0. Continue current expectant management. Category I tracing.          Mert Bourgeois MD 7/17/2023 3:44 PM

## 2023-07-17 NOTE — OB LABOR/OXYTOCIN SAFETY PROGRESS
Labor Progress Note - Britany November 28 y.o. female MRN: [id number]    Unit/Bed#: -01 Encounter: [id number]    Dose (nina-units/min) Oxytocin: 0 nina-units/min (Held Per. Dr. Gm Leal)  Contraction Frequency (minutes): 2.5-3.5  Contraction Quality: Strong  Tachysystole: No   Cervical Dilation: 10  Dilation Complete Date: 07/17/23  Dilation Complete Time: 1640  Cervical Effacement: 100  Fetal Station: 1  Baseline Rate: 145 bpm  Fetal Heart Rate: 144 BPM  FHR Category: Category I  Oxytocin Safety Progress Check: Safety check completed            Vital Signs:   Vitals:    07/17/23 1630   BP: 122/74   Pulse: 101   Resp:    Temp:    SpO2:        Notes/comments: Reassessment for increased pressure. Cervical check as above. FHT category I. Dr. Hao Lay aware.          Faustino Wang MD 7/17/2023 4:41 PM

## 2023-07-17 NOTE — OB LABOR/OXYTOCIN SAFETY PROGRESS
Oxytocin Safety Progress Check Note - Pamila Flow 28 y.o. female MRN: [id number]    Unit/Bed#: -01 Encounter: [id number]    Dose (nina-units/min) Oxytocin: 2 nina-units/min  Contraction Frequency (minutes): 2-3  Contraction Quality: Mild  Tachysystole: No   Cervical Dilation: 3        Cervical Effacement: 50  Fetal Station: -3  Baseline Rate: 130 bpm  Fetal Heart Rate: 122 BPM  FHR Category: Category II               Vital Signs:   Vitals:    07/17/23 0102   BP: 114/67   Pulse: 104   Resp:    Temp:        Notes/comments:   Patient had another deep late after contraction. SVE unchanged. On palpation of her abdomen, it feels like patient had multiple contractions in a row that are not picking up on tocometry. Terbutaline brought in the room but not given. Patient repositioned and fluid bolus given. Will continue to monitor and will try to restart pitocin when able.  D/w Dr. Guillermo Benitez MD 7/17/2023 2:06 AM

## 2023-07-17 NOTE — PLAN OF CARE
Problem: Knowledge Deficit  Goal: Verbalizes understanding of labor plan  Description: Assess patient/family/caregiver's baseline knowledge level and ability to understand information. Provide education via patient/family/caregiver's preferred learning method at appropriate level of understanding. 1. Provide teaching at level of understanding. 2. Provide teaching via preferred learning method(s). Outcome: Progressing  Goal: Patient/family/caregiver demonstrates understanding of disease process, treatment plan, medications, and discharge instructions  Description: Complete learning assessment and assess knowledge base. Interventions:  - Provide teaching at level of understanding  - Provide teaching via preferred learning methods  Outcome: Progressing     Problem: Labor & Delivery  Goal: Manages discomfort  Description: Assess and monitor for signs and symptoms of discomfort. Assess patient's pain level regularly and per hospital policy. Administer medications as ordered. Support use of nonpharmacological methods to help control pain such as distraction, imagery, relaxation, and application of heat and cold. Collaborate with interdisciplinary team and patient to determine appropriate pain management plan. 1. Include patient in decisions related to comfort. 2. Offer non-pharmacological pain management interventions. 3. Report ineffective pain management to physician. Outcome: Progressing  Goal: Patient vital signs are stable  Description: 1. Assess vital signs - vaginal delivery.   Outcome: Progressing     Problem: BIRTH - VAGINAL/ SECTION  Goal: Fetal and maternal status remain reassuring during the birth process  Description: INTERVENTIONS:  - Monitor vital signs  - Monitor fetal heart rate  - Monitor uterine activity  - Monitor labor progression (vaginal delivery)  - DVT prophylaxis  - Antibiotic prophylaxis  Outcome: Progressing  Goal: Emotionally satisfying birthing experience for mother/fetus  Description: Interventions:  - Assess, plan, implement and evaluate the nursing care given to the patient in labor  - Advocate the philosophy that each childbirth experience is a unique experience and support the family's chosen level of involvement and control during the labor process   - Actively participate in both the patient's and family's teaching of the birth process  - Consider cultural, Voodoo and age-specific factors and plan care for the patient in labor  Outcome: Progressing     Problem: PAIN - ADULT  Goal: Verbalizes/displays adequate comfort level or baseline comfort level  Description: Interventions:  - Encourage patient to monitor pain and request assistance  - Assess pain using appropriate pain scale  - Administer analgesics based on type and severity of pain and evaluate response  - Implement non-pharmacological measures as appropriate and evaluate response  - Consider cultural and social influences on pain and pain management  - Notify physician/advanced practitioner if interventions unsuccessful or patient reports new pain  Outcome: Progressing     Problem: INFECTION - ADULT  Goal: Absence or prevention of progression during hospitalization  Description: INTERVENTIONS:  - Assess and monitor for signs and symptoms of infection  - Monitor lab/diagnostic results  - Monitor all insertion sites, i.e. indwelling lines, tubes, and drains  - Monitor endotracheal if appropriate and nasal secretions for changes in amount and color  - Ruso appropriate cooling/warming therapies per order  - Administer medications as ordered  - Instruct and encourage patient and family to use good hand hygiene technique  - Identify and instruct in appropriate isolation precautions for identified infection/condition  Outcome: Progressing  Goal: Absence of fever/infection during neutropenic period  Description: INTERVENTIONS:  - Monitor WBC    Outcome: Progressing     Problem: SAFETY ADULT  Goal: Patient will remain free of falls  Description: INTERVENTIONS:  - Educate patient/family on patient safety including physical limitations  - Instruct patient to call for assistance with activity   - Consult OT/PT to assist with strengthening/mobility   - Keep Call bell within reach  - Keep bed low and locked with side rails adjusted as appropriate  - Keep care items and personal belongings within reach  - Initiate and maintain comfort rounds  Outcome: Progressing  Goal: Maintain or return to baseline ADL function  Description: INTERVENTIONS:  -  Assess patient's ability to carry out ADLs; assess patient's baseline for ADL function and identify physical deficits which impact ability to perform ADLs (bathing, care of mouth/teeth, toileting, grooming, dressing, etc.)  - Assess/evaluate cause of self-care deficits   - Assess range of motion  - Assess patient's mobility; develop plan if impaired  - Assess patient's need for assistive devices and provide as appropriate  - Encourage maximum independence but intervene and supervise when necessary  - Involve family in performance of ADLs  - Assess for home care needs following discharge   - Consider OT consult to assist with ADL evaluation and planning for discharge  - Provide patient education as appropriate  Outcome: Progressing  Goal: Maintains/Returns to pre admission functional level  Description: INTERVENTIONS:  - Perform BMAT or MOVE assessment daily.   - Set and communicate daily mobility goal to care team and patient/family/caregiver.    - Collaborate with rehabilitation services on mobility goals if consulted  - Out of bed for toileting  - Record patient progress and toleration of activity level   Outcome: Progressing     Problem: DISCHARGE PLANNING  Goal: Discharge to home or other facility with appropriate resources  Description: INTERVENTIONS:  - Identify barriers to discharge w/patient and caregiver  - Arrange for needed discharge resources and transportation as appropriate  - Identify discharge learning needs (meds, wound care, etc.)  - Arrange for interpretive services to assist at discharge as needed  - Refer to Case Management Department for coordinating discharge planning if the patient needs post-hospital services based on physician/advanced practitioner order or complex needs related to functional status, cognitive ability, or social support system  Outcome: Progressing

## 2023-07-18 LAB
ALBUMIN SERPL BCP-MCNC: 2.6 G/DL (ref 3.5–5)
ALP SERPL-CCNC: 115 U/L (ref 34–104)
ALT SERPL W P-5'-P-CCNC: 6 U/L (ref 7–52)
ANION GAP SERPL CALCULATED.3IONS-SCNC: 7 MMOL/L
APTT PPP: 30 SECONDS (ref 23–37)
AST SERPL W P-5'-P-CCNC: 15 U/L (ref 13–39)
BASOPHILS # BLD AUTO: 0.06 THOUSANDS/ÂΜL (ref 0–0.1)
BASOPHILS # BLD AUTO: 0.07 THOUSANDS/ÂΜL (ref 0–0.1)
BASOPHILS NFR BLD AUTO: 0 % (ref 0–1)
BASOPHILS NFR BLD AUTO: 0 % (ref 0–1)
BILIRUB SERPL-MCNC: 0.47 MG/DL (ref 0.2–1)
BUN SERPL-MCNC: 8 MG/DL (ref 5–25)
CALCIUM ALBUM COR SERPL-MCNC: 9.4 MG/DL (ref 8.3–10.1)
CALCIUM SERPL-MCNC: 8.3 MG/DL (ref 8.4–10.2)
CHLORIDE SERPL-SCNC: 110 MMOL/L (ref 96–108)
CO2 SERPL-SCNC: 21 MMOL/L (ref 21–32)
CREAT SERPL-MCNC: 0.77 MG/DL (ref 0.6–1.3)
EOSINOPHIL # BLD AUTO: 0.01 THOUSAND/ÂΜL (ref 0–0.61)
EOSINOPHIL # BLD AUTO: 0.04 THOUSAND/ÂΜL (ref 0–0.61)
EOSINOPHIL NFR BLD AUTO: 0 % (ref 0–6)
EOSINOPHIL NFR BLD AUTO: 0 % (ref 0–6)
ERYTHROCYTE [DISTWIDTH] IN BLOOD BY AUTOMATED COUNT: 18.8 % (ref 11.6–15.1)
ERYTHROCYTE [DISTWIDTH] IN BLOOD BY AUTOMATED COUNT: 18.9 % (ref 11.6–15.1)
ERYTHROCYTE [DISTWIDTH] IN BLOOD BY AUTOMATED COUNT: 19 % (ref 11.6–15.1)
FIBRINOGEN PPP-MCNC: 449 MG/DL (ref 227–495)
GFR SERPL CREATININE-BSD FRML MDRD: 105 ML/MIN/1.73SQ M
GLUCOSE SERPL-MCNC: 109 MG/DL (ref 65–140)
HCT VFR BLD AUTO: 22.6 % (ref 34.8–46.1)
HCT VFR BLD AUTO: 22.7 % (ref 34.8–46.1)
HCT VFR BLD AUTO: 24.1 % (ref 34.8–46.1)
HGB BLD-MCNC: 6.7 G/DL (ref 11.5–15.4)
HGB BLD-MCNC: 6.8 G/DL (ref 11.5–15.4)
HGB BLD-MCNC: 7.2 G/DL (ref 11.5–15.4)
IMM GRANULOCYTES # BLD AUTO: 0.2 THOUSAND/UL (ref 0–0.2)
IMM GRANULOCYTES # BLD AUTO: 0.24 THOUSAND/UL (ref 0–0.2)
IMM GRANULOCYTES NFR BLD AUTO: 1 % (ref 0–2)
IMM GRANULOCYTES NFR BLD AUTO: 1 % (ref 0–2)
INR PPP: 1.16 (ref 0.84–1.19)
LYMPHOCYTES # BLD AUTO: 3.07 THOUSANDS/ÂΜL (ref 0.6–4.47)
LYMPHOCYTES # BLD AUTO: 4.16 THOUSANDS/ÂΜL (ref 0.6–4.47)
LYMPHOCYTES NFR BLD AUTO: 11 % (ref 14–44)
LYMPHOCYTES NFR BLD AUTO: 16 % (ref 14–44)
MCH RBC QN AUTO: 21.6 PG (ref 26.8–34.3)
MCH RBC QN AUTO: 21.7 PG (ref 26.8–34.3)
MCH RBC QN AUTO: 22 PG (ref 26.8–34.3)
MCHC RBC AUTO-ENTMCNC: 29.5 G/DL (ref 31.4–37.4)
MCHC RBC AUTO-ENTMCNC: 29.9 G/DL (ref 31.4–37.4)
MCHC RBC AUTO-ENTMCNC: 30.1 G/DL (ref 31.4–37.4)
MCV RBC AUTO: 72 FL (ref 82–98)
MCV RBC AUTO: 73 FL (ref 82–98)
MCV RBC AUTO: 74 FL (ref 82–98)
MONOCYTES # BLD AUTO: 1.97 THOUSAND/ÂΜL (ref 0.17–1.22)
MONOCYTES # BLD AUTO: 2.58 THOUSAND/ÂΜL (ref 0.17–1.22)
MONOCYTES NFR BLD AUTO: 10 % (ref 4–12)
MONOCYTES NFR BLD AUTO: 7 % (ref 4–12)
NEUTROPHILS # BLD AUTO: 20.17 THOUSANDS/ÂΜL (ref 1.85–7.62)
NEUTROPHILS # BLD AUTO: 21.25 THOUSANDS/ÂΜL (ref 1.85–7.62)
NEUTS SEG NFR BLD AUTO: 76 % (ref 43–75)
NEUTS SEG NFR BLD AUTO: 78 % (ref 43–75)
NRBC BLD AUTO-RTO: 0 /100 WBCS
NRBC BLD AUTO-RTO: 0 /100 WBCS
PLATELET # BLD AUTO: 225 THOUSANDS/UL (ref 149–390)
PLATELET # BLD AUTO: 235 THOUSANDS/UL (ref 149–390)
PLATELET # BLD AUTO: 249 THOUSANDS/UL (ref 149–390)
PMV BLD AUTO: 11.1 FL (ref 8.9–12.7)
PMV BLD AUTO: 11.3 FL (ref 8.9–12.7)
PMV BLD AUTO: 11.8 FL (ref 8.9–12.7)
POTASSIUM SERPL-SCNC: 4 MMOL/L (ref 3.5–5.3)
PROT SERPL-MCNC: 4.5 G/DL (ref 6.4–8.4)
PROTHROMBIN TIME: 15 SECONDS (ref 11.6–14.5)
RBC # BLD AUTO: 3.09 MILLION/UL (ref 3.81–5.12)
RBC # BLD AUTO: 3.09 MILLION/UL (ref 3.81–5.12)
RBC # BLD AUTO: 3.33 MILLION/UL (ref 3.81–5.12)
SODIUM SERPL-SCNC: 138 MMOL/L (ref 135–147)
WBC # BLD AUTO: 21.42 THOUSAND/UL (ref 4.31–10.16)
WBC # BLD AUTO: 26.6 THOUSAND/UL (ref 4.31–10.16)
WBC # BLD AUTO: 27.22 THOUSAND/UL (ref 4.31–10.16)

## 2023-07-18 PROCEDURE — P9016 RBC LEUKOCYTES REDUCED: HCPCS

## 2023-07-18 PROCEDURE — 85027 COMPLETE CBC AUTOMATED: CPT | Performed by: OBSTETRICS & GYNECOLOGY

## 2023-07-18 PROCEDURE — 30233P1 TRANSFUSION OF NONAUTOLOGOUS FROZEN RED CELLS INTO PERIPHERAL VEIN, PERCUTANEOUS APPROACH: ICD-10-PCS | Performed by: STUDENT IN AN ORGANIZED HEALTH CARE EDUCATION/TRAINING PROGRAM

## 2023-07-18 PROCEDURE — 99024 POSTOP FOLLOW-UP VISIT: CPT | Performed by: OBSTETRICS & GYNECOLOGY

## 2023-07-18 PROCEDURE — 85025 COMPLETE CBC W/AUTO DIFF WBC: CPT | Performed by: OBSTETRICS & GYNECOLOGY

## 2023-07-18 RX ORDER — IBUPROFEN 600 MG/1
600 TABLET ORAL EVERY 6 HOURS PRN
Status: DISCONTINUED | OUTPATIENT
Start: 2023-07-18 | End: 2023-07-19 | Stop reason: HOSPADM

## 2023-07-18 RX ADMIN — DOCUSATE SODIUM 100 MG: 100 CAPSULE, LIQUID FILLED ORAL at 18:28

## 2023-07-18 RX ADMIN — KETOROLAC TROMETHAMINE 15 MG: 30 INJECTION, SOLUTION INTRAMUSCULAR at 00:15

## 2023-07-18 RX ADMIN — IRON SUCROSE 200 MG: 20 INJECTION, SOLUTION INTRAVENOUS at 13:15

## 2023-07-18 RX ADMIN — Medication 62.5 MILLI-UNITS/MIN: at 07:33

## 2023-07-18 RX ADMIN — DOCUSATE SODIUM 100 MG: 100 CAPSULE, LIQUID FILLED ORAL at 10:39

## 2023-07-18 RX ADMIN — KETOROLAC TROMETHAMINE 15 MG: 30 INJECTION, SOLUTION INTRAMUSCULAR at 18:28

## 2023-07-18 NOTE — PROGRESS NOTES
Called to the bedside to evaluate patient's bleeding. They have had to change her bedsheets and patient has had passage of clots. At the bedside, patient is laying in bed. She does not appear in acute distress. She has voided 2x since delivery. She reports some dizziness. Dr. Figueroa Record called to the bedside for evaluation with ultrasound. Uterus is notably filled with clots. 0.20mg IM methergine given and 8h bag of pitocin started. Patient is very uncomfortable with exams. 4mg IV morphine given. On bimanual exam, uterus is boggy and filled with clot. Appx 800 of clots expressed with exam.   On transabdominal ultrasound, uterus appears empty with thin endometrial stripe. Will continue to monitor bleeding. Plan to repeat labs- CBC, CMP coagulation times. Monitor vitals closely.      Shannon South MD  OBGYN PGY-4  11:50 PM  7/17/2023

## 2023-07-18 NOTE — PLAN OF CARE
Problem: Knowledge Deficit  Goal: Verbalizes understanding of labor plan  Description: Assess patient/family/caregiver's baseline knowledge level and ability to understand information. Provide education via patient/family/caregiver's preferred learning method at appropriate level of understanding. 1. Provide teaching at level of understanding. 2. Provide teaching via preferred learning method(s). Outcome: Progressing  Goal: Patient/family/caregiver demonstrates understanding of disease process, treatment plan, medications, and discharge instructions  Description: Complete learning assessment and assess knowledge base. Interventions:  - Provide teaching at level of understanding  - Provide teaching via preferred learning methods  Outcome: Progressing     Problem: Labor & Delivery  Goal: Manages discomfort  Description: Assess and monitor for signs and symptoms of discomfort. Assess patient's pain level regularly and per hospital policy. Administer medications as ordered. Support use of nonpharmacological methods to help control pain such as distraction, imagery, relaxation, and application of heat and cold. Collaborate with interdisciplinary team and patient to determine appropriate pain management plan. 1. Include patient in decisions related to comfort. 2. Offer non-pharmacological pain management interventions. 3. Report ineffective pain management to physician. Outcome: Progressing  Goal: Patient vital signs are stable  Description: 1. Assess vital signs - vaginal delivery.   Outcome: Progressing     Problem: BIRTH - VAGINAL/ SECTION  Goal: Fetal and maternal status remain reassuring during the birth process  Description: INTERVENTIONS:  - Monitor vital signs  - Monitor fetal heart rate  - Monitor uterine activity  - Monitor labor progression (vaginal delivery)  - DVT prophylaxis  - Antibiotic prophylaxis  Outcome: Progressing  Goal: Emotionally satisfying birthing experience for mother/fetus  Description: Interventions:  - Assess, plan, implement and evaluate the nursing care given to the patient in labor  - Advocate the philosophy that each childbirth experience is a unique experience and support the family's chosen level of involvement and control during the labor process   - Actively participate in both the patient's and family's teaching of the birth process  - Consider cultural, Jain and age-specific factors and plan care for the patient in labor  Outcome: Progressing     Problem: PAIN - ADULT  Goal: Verbalizes/displays adequate comfort level or baseline comfort level  Description: Interventions:  - Encourage patient to monitor pain and request assistance  - Assess pain using appropriate pain scale  - Administer analgesics based on type and severity of pain and evaluate response  - Implement non-pharmacological measures as appropriate and evaluate response  - Consider cultural and social influences on pain and pain management  - Notify physician/advanced practitioner if interventions unsuccessful or patient reports new pain  Outcome: Progressing     Problem: INFECTION - ADULT  Goal: Absence or prevention of progression during hospitalization  Description: INTERVENTIONS:  - Assess and monitor for signs and symptoms of infection  - Monitor lab/diagnostic results  - Monitor all insertion sites, i.e. indwelling lines, tubes, and drains  - Monitor endotracheal if appropriate and nasal secretions for changes in amount and color  - [place] appropriate cooling/warming therapies per order  - Administer medications as ordered  - Instruct and encourage patient and family to use good hand hygiene technique  - Identify and instruct in appropriate isolation precautions for identified infection/condition  Outcome: Progressing  Goal: Absence of fever/infection during neutropenic period  Description: INTERVENTIONS:  - Monitor WBC    Outcome: Progressing     Problem: SAFETY ADULT  Goal: Patient will remain free of falls  Description: INTERVENTIONS:  - Educate patient/family on patient safety including physical limitations  - Instruct patient to call for assistance with activity   - Consult OT/PT to assist with strengthening/mobility   - Keep Call bell within reach  - Keep bed low and locked with side rails adjusted as appropriate  - Keep care items and personal belongings within reach  - Initiate and maintain comfort rounds  - Make Fall Risk Sign visible to staff  - Obtain necessary fall risk management equipment  - Apply yellow socks and bracelet for high fall risk patients  - Consider moving patient to room near nurses station  Outcome: Progressing  Goal: Maintain or return to baseline ADL function  Description: INTERVENTIONS:  -  Assess patient's ability to carry out ADLs; assess patient's baseline for ADL function and identify physical deficits which impact ability to perform ADLs (bathing, care of mouth/teeth, toileting, grooming, dressing, etc.)  - Assess/evaluate cause of self-care deficits   - Assess range of motion  - Assess patient's mobility; develop plan if impaired  - Assess patient's need for assistive devices and provide as appropriate  - Encourage maximum independence but intervene and supervise when necessary  - Involve family in performance of ADLs  - Assess for home care needs following discharge   - Consider OT consult to assist with ADL evaluation and planning for discharge  - Provide patient education as appropriate  Outcome: Progressing  Goal: Maintains/Returns to pre admission functional level  Description: INTERVENTIONS:  - Perform BMAT or MOVE assessment daily.   - Set and communicate daily mobility goal to care team and patient/family/caregiver.    - Collaborate with rehabilitation services on mobility goals if consulted  - Out of bed for toileting  - Record patient progress and toleration of activity level   Outcome: Progressing     Problem: DISCHARGE PLANNING  Goal: Discharge to home or other facility with appropriate resources  Description: INTERVENTIONS:  - Identify barriers to discharge w/patient and caregiver  - Arrange for needed discharge resources and transportation as appropriate  - Identify discharge learning needs (meds, wound care, etc.)  - Arrange for interpretive services to assist at discharge as needed  - Refer to Case Management Department for coordinating discharge planning if the patient needs post-hospital services based on physician/advanced practitioner order or complex needs related to functional status, cognitive ability, or social support system  Outcome: Progressing

## 2023-07-18 NOTE — PLAN OF CARE
Problem: Knowledge Deficit  Goal: Verbalizes understanding of labor plan  Description: Assess patient/family/caregiver's baseline knowledge level and ability to understand information. Provide education via patient/family/caregiver's preferred learning method at appropriate level of understanding. 1. Provide teaching at level of understanding. 2. Provide teaching via preferred learning method(s). Outcome: Progressing  Goal: Patient/family/caregiver demonstrates understanding of disease process, treatment plan, medications, and discharge instructions  Description: Complete learning assessment and assess knowledge base. Interventions:  - Provide teaching at level of understanding  - Provide teaching via preferred learning methods  Outcome: Progressing     Problem: Labor & Delivery  Goal: Manages discomfort  Description: Assess and monitor for signs and symptoms of discomfort. Assess patient's pain level regularly and per hospital policy. Administer medications as ordered. Support use of nonpharmacological methods to help control pain such as distraction, imagery, relaxation, and application of heat and cold. Collaborate with interdisciplinary team and patient to determine appropriate pain management plan. 1. Include patient in decisions related to comfort. 2. Offer non-pharmacological pain management interventions. 3. Report ineffective pain management to physician. Outcome: Progressing  Goal: Patient vital signs are stable  Description: 1. Assess vital signs - vaginal delivery.   Outcome: Progressing     Problem: BIRTH - VAGINAL/ SECTION  Goal: Fetal and maternal status remain reassuring during the birth process  Description: INTERVENTIONS:  - Monitor vital signs  - Monitor fetal heart rate  - Monitor uterine activity  - Monitor labor progression (vaginal delivery)  - DVT prophylaxis  - Antibiotic prophylaxis  Outcome: Progressing  Goal: Emotionally satisfying birthing experience for mother/fetus  Description: Interventions:  - Assess, plan, implement and evaluate the nursing care given to the patient in labor  - Advocate the philosophy that each childbirth experience is a unique experience and support the family's chosen level of involvement and control during the labor process   - Actively participate in both the patient's and family's teaching of the birth process  - Consider cultural, Religion and age-specific factors and plan care for the patient in labor  Outcome: Progressing     Problem: PAIN - ADULT  Goal: Verbalizes/displays adequate comfort level or baseline comfort level  Description: Interventions:  - Encourage patient to monitor pain and request assistance  - Assess pain using appropriate pain scale  - Administer analgesics based on type and severity of pain and evaluate response  - Implement non-pharmacological measures as appropriate and evaluate response  - Consider cultural and social influences on pain and pain management  - Notify physician/advanced practitioner if interventions unsuccessful or patient reports new pain  Outcome: Progressing     Problem: INFECTION - ADULT  Goal: Absence or prevention of progression during hospitalization  Description: INTERVENTIONS:  - Assess and monitor for signs and symptoms of infection  - Monitor lab/diagnostic results  - Monitor all insertion sites, i.e. indwelling lines, tubes, and drains  - Monitor endotracheal if appropriate and nasal secretions for changes in amount and color  - [place] appropriate cooling/warming therapies per order  - Administer medications as ordered  - Instruct and encourage patient and family to use good hand hygiene technique  - Identify and instruct in appropriate isolation precautions for identified infection/condition  Outcome: Progressing  Goal: Absence of fever/infection during neutropenic period  Description: INTERVENTIONS:  - Monitor WBC    Outcome: Progressing     Problem: SAFETY ADULT  Goal: Patient will remain free of falls  Description: INTERVENTIONS:  - Educate patient/family on patient safety including physical limitations  - Instruct patient to call for assistance with activity   - Consult OT/PT to assist with strengthening/mobility   - Keep Call bell within reach  - Keep bed low and locked with side rails adjusted as appropriate  - Keep care items and personal belongings within reach  - Initiate and maintain comfort rounds  - Make Fall Risk Sign visible to staff  - Offer Toileting every  Hours, in advance of need  - Initiate/Maintain alarm  - Obtain necessary fall risk management equipment:   - Apply yellow socks and bracelet for high fall risk patients  - Consider moving patient to room near nurses station  Outcome: Progressing  Goal: Maintain or return to baseline ADL function  Description: INTERVENTIONS:  -  Assess patient's ability to carry out ADLs; assess patient's baseline for ADL function and identify physical deficits which impact ability to perform ADLs (bathing, care of mouth/teeth, toileting, grooming, dressing, etc.)  - Assess/evaluate cause of self-care deficits   - Assess range of motion  - Assess patient's mobility; develop plan if impaired  - Assess patient's need for assistive devices and provide as appropriate  - Encourage maximum independence but intervene and supervise when necessary  - Involve family in performance of ADLs  - Assess for home care needs following discharge   - Consider OT consult to assist with ADL evaluation and planning for discharge  - Provide patient education as appropriate  Outcome: Progressing  Goal: Maintains/Returns to pre admission functional level  Description: INTERVENTIONS:  - Perform BMAT or MOVE assessment daily.   - Set and communicate daily mobility goal to care team and patient/family/caregiver. - Collaborate with rehabilitation services on mobility goals if consulted  - Perform Range of Motion  times a day. - Reposition patient every  hours.   - Dangle patient  times a day  - Stand patient  times a day  - Ambulate patient  times a day  - Out of bed to chair  times a day   - Out of bed for meals times a day  - Out of bed for toileting  - Record patient progress and toleration of activity level   Outcome: Progressing     Problem: DISCHARGE PLANNING  Goal: Discharge to home or other facility with appropriate resources  Description: INTERVENTIONS:  - Identify barriers to discharge w/patient and caregiver  - Arrange for needed discharge resources and transportation as appropriate  - Identify discharge learning needs (meds, wound care, etc.)  - Arrange for interpretive services to assist at discharge as needed  - Refer to Case Management Department for coordinating discharge planning if the patient needs post-hospital services based on physician/advanced practitioner order or complex needs related to functional status, cognitive ability, or social support system  Outcome: Progressing

## 2023-07-18 NOTE — QUICK NOTE
Repeat Hgb 6.8 s/p 1u pRBC and venofer infusion. Will plan to transfuse additional 1u pRBC. Patient has already been crossed.     Joshua Rodrigues MD  OB/GYN PGY-4  6:17 PM  07/18/23

## 2023-07-18 NOTE — LACTATION NOTE
This note was copied from a baby's chart. CONSULT - LACTATION  Baby Boy Reed Abel) Dunia Varner 1 days male MRN: [id number]    [address] [facility] Room / Bed: (N)/(N) Encounter: [id number]    Maternal Information     MOTHER:  Cecilia Elizalde  Maternal Age: 28 y.o.   OB History: # 1 - Date: 23, Sex: Male, Weight: 3275 g (7 lb 3.5 oz), GA: 37w6d, Delivery: None, Apgar1: 8, Apgar5: 9, Living: Living, Birth Comments: None   Previouse breast reduction surgery? No    Lactation history:   Has patient previously breast fed: No   How long had patient previously breast fed:     Previous breast feeding complications:     History reviewed. No pertinent surgical history. Birth information:  YOB: 2024   Time of birth: 5:30 PM   Sex: male   Delivery type:     Birth Weight: 3275 g (7 lb 3.5 oz)   Percent of Weight Change: 0%     Gestational Age: 38w9d   [unfilled]    Assessment     Breast and nipple assessment: normal assessment     Assessment: normal assessment    Feeding assessment: feeding well  LATCH:  Latch: Grasps breast, tongue down, lips flanged, rhythmic sucking   Audible Swallowing: Spontaneous and intermittent (24 hours old)   Type of Nipple: Everted (After stimulation)   Comfort (Breast/Nipple): Soft/non-tender   Hold (Positioning): Partial assist, teach one side, mother does other, staff holds   [facility]         Having latch problems?  No   Position(s) Used Football   Breasts/Nipples   Left Breast Soft   Right Breast Soft   Left Nipple Everted   Right Nipple Everted   Intervention Hand expression  (highly effective)   Breastfeeding Progress Not yet established;Breastfeeding well   Reasons for not Breastfeeding Maternal preference  (education provided on formula risks/preparation/stimulation of breasts to provide breast milk)   Breast Pump   Pump 3  (Will need manual breast pump due to having no insurance.)   Patient Follow-Up   Lactation Consult Status 2   Follow-Up Type Inpatient;Call as needed   Other OB Lactation Documentation    Additional Problem Noted Uyen (baby name) has had 3 feedings of formula at 25 ml due to patient verbalization of mixed feeding. Education provided. HE effective. Baby latches very well with assistance due to IV administration in process for Cecilia at this time. Risk Factors Language barrier  ( [id number] utilized.)     Feeding recommendations:  breast feed on demand     Discussed risks for early supplementation: over feeding, longer digestion times, engorgement for mom, lower milk supply for mom, and nipple confusion. Benefits of breast feeding for infant's intestinal tract, less engorgement for mom, protection from multiple disease processes as infant develops, avoidance of over feeding for infant, less nipple confusion, and increased health benefits for mom. Information on hand expression given. Discussed benefits of knowing how to manually express breast including stimulating milk supply, softening nipple for latch and evacuating breast in the event of engorgement. Reviewed how to bring baby to the breast so that his lower lip and chin touch the breast with his nose just above the nipple to encourage a wider, more asymmetric latch. Met with mother. Provided mother with Ready, Set, Baby booklet which contained information on:  Hand expression with access to QR codes to review hand expression. Positioning and latch reviewed as well as showing images of other feeding positions. Discussed the properties of a good latch in any position. Feeding on cue and what that means for recognizing infant's hunger, s/s that baby is getting enough milk and some s/s that breastfeeding dyad may need further help  Skin to Skin contact an benefits to mom and baby  Avoidance of pacifiers for the first month discussed.    Gave information on common concerns, what to expect the first few weeks after delivery, preparing for other caregivers, and how partners can help. Resources for support also provided. Encouraged parents to call for assistance, questions, and concerns about breastfeeding. Extension provided.           Kailey Kendrick RN 7/18/2023 12:26 PM

## 2023-07-18 NOTE — DISCHARGE SUMMARY
Discharge Summary - OB/GYN  Burke Bellamy 28 y.o. female MRN: [id number]  Unit/Bed#: -01 Encounter: [id number]    Admission Date: 2023     Discharge Date: 2023    Admitting Attending: Hortencia Hodge MD    Delivering Attending: Dr. Colin Arellano    Discharging Attending: Dr. Freida Hollis    Principal Diagnosis: Pregnancy at 37w6d    Secondary Diagnosis:   1. gHTN  2. Postpartum Hemorrhage   2. Hx GBS infection  3. Cervical polyp  4. Obesity in pregnancy   5. Anemia in pregnancy     Procedures: spontaneous vaginal delivery, repair of spontaneous first degree laceration     Anesthesia: epidural    Hospital course: Ms. Burke Bellamy is a 28 y.o.  at 38w9d. She presented to labor and delivery for contractions and loss of fluid. Her pregnancy was complicated by anemia and obesity. She received a diagnosis of gHTN intrapartum. On exam in triage she was noted to be closed/thick/high. She was admitted for category II tracing with late decelerations. She was induced with reyna balloon and pitocin. Amniotomy was performed for clear fluid. Epidural was placed for maternal analgesia. Patient experienced late decelerations secondary to hypotension s/p epidural placement. She had intermittent deep late decelerations that were responsive to resuscitation. Patient continued to have late and variable decelerations during a period of tachysystole while pitocin was off. Terbutaline was given. An IUPC was placed for more accurate fetal monitoring and pitocin titration. At the time of placement, meconium stained fluid was noted. FHT category I for remainder of labor. Patient progressed to complete. She delivered a viable male  on 2023 at 1730. Weight 7lbs 3.5oz via normal spontaneous vaginal delivery. She sustained a 1 degree perineal laceration and  during delivery which was adequately repaired. Apgars were 8 (1 min) and 9 (5 min).  was transferred to  nursery.  Patient tolerated the procedure well. Her post-delivery course was complicated by post partum hemorrhage that was initially managed with IM methergine and pitocin. On PPD # 1 she was noted to have symptomatic anemia  and  hgb was 6.7. She received transfusion of 1 unit of packed red blood cells. Her post transfusion hgb was 7.9. On PPD #2 she reported calf pain and Duplex ultrasound scan to rule out DVT was unremarkable. On day of discharge, she was ambulating and able to reasonably perform all ADLs. She was voiding and had appropriate bowel function. Pain was well controlled. She was discharged home on postpartum day #2 without complications. Patient was instructed to follow up with her OB as an outpatient and was given appropriate warnings to call provider if she develops signs of infection or uncontrolled pain. Complications: none apparent    Condition at discharge: good     Discharge instructions/Information to patient and family:   See after visit summary for information provided to patient and family. Provisions for Follow-Up Care:  See after visit summary for information related to follow-up care and any pertinent home health orders. Disposition: See After Visit Summary for discharge disposition information. Planned Readmission: No    Discharge medications and instructions:   Please see AVS for full list of medications upon discharge.       Penny Houser MD  OBGYN PGY-2

## 2023-07-18 NOTE — PROGRESS NOTES
Progress Note - OB/GYN  Abimbola Blakely 28 y.o. female MRN: [id number]  Unit/Bed#:  321-01 Encounter: [id number]    Assessment and Plan     Abimbola Blakely is a patient of: [address] [place] [address]. She is PPD# 1 s/p  complicated by postpartum hemorrhage. She is stable       *  (spontaneous vaginal delivery) at 37 weeks   Assessment & Plan  Lochia WNL   Recovering well   Appropriate bowel and bladder function   Pain well controlled   Tolerating diet   Breastfeeding: plans to but bottlefed last night   Ambulating without issues   No lower extremity tenderness  GBS positive  Rh positive   Contraception: undecided    Postpartum hemorrhage  Assessment & Plan  QBL 1387, S/p Methergine and pitocin   Hgb 10.8-->7.2-->  AM cbc pending , venofer ordered  Patient symptomatic this AM ( Dizziness and light headed): consider transfusion    Non-reassuring fetal heart rate with late deceleration  Assessment & Plan    Overall moderate variability, however due to decelerations at term patient admitted for delivery    GBS (group B streptococcus) infection  Assessment & Plan  S/P PCN    Cervical polyp  Assessment & Plan  2 cervical polyps noted during prenatal appointments  No hx of bleeding    Obesity in pregnancy, antepartum  Assessment & Plan  BMI 33    Anemia during pregnancy in third trimester  Assessment & Plan  On po iron supplementation       Disposition    - Anticipate discharge home on PPD# 2-3      Subjective/Objective     Chief Complaint: Postpartum State     Subjective:    Abimbola Blakely is PPD#1 s/p . She has no current complaints. Pain is well controlled. Patient is currently voiding. She is ambulating. Patient is currently passing flatus and has had no bowel movement. She is tolerating PO, and denies nausea or vomitting. Patient denies fever, chills, chest pain, shortness of breath, or calf tenderness. Lochia is normal. She is  Breastfeeding. She is recovering well and is stable.        Vitals: /70 (BP Location: Right arm)   Pulse 92   Temp 97.6 °F (36.4 °C) (Oral)   Resp 18   Ht 5' 4" (1.626 m)   Wt 87.5 kg (193 lb)   LMP 10/25/2022 (Exact Date)   SpO2 99%   Breastfeeding Yes   BMI 33.13 kg/m²       Intake/Output Summary (Last 24 hours) at 7/18/2023 0659  Last data filed at 7/18/2023 0220  Gross per 24 hour   Intake 3652.18 ml   Output 4262 ml   Net -609.82 ml       Invasive Devices     Peripheral Intravenous Line  Duration           Peripheral IV 07/16/23 Left;Dorsal (posterior) Hand 1 day                Physical Exam:   GEN: Nabil Ordoñez appears well, alert and oriented x 3, pleasant and cooperative   CARDIO: RRR, no murmurs or rubs  RESP:  CTAB, no wheezes or rales  ABDOMEN: soft, no tenderness, no distention, fundus @ 2 cm below u   EXTREMITIES: non tender, no erythema      Labs:     Hemoglobin   Date Value Ref Range Status   07/17/2023 7.2 (L) 11.5 - 15.4 g/dL Final   07/16/2023 10.8 (L) 11.5 - 15.4 g/dL Final     WBC   Date Value Ref Range Status   07/17/2023 27.22 (H) 4.31 - 10.16 Thousand/uL Final   07/16/2023 17.36 (H) 4.31 - 10.16 Thousand/uL Final     Platelets   Date Value Ref Range Status   07/17/2023 249 149 - 390 Thousands/uL Final   07/16/2023 312 149 - 390 Thousands/uL Final     Creatinine   Date Value Ref Range Status   07/17/2023 0.77 0.60 - 1.30 mg/dL Final     Comment:     Standardized to IDMS reference method   05/04/2023 0.42 (L) 0.60 - 1.30 mg/dL Final     Comment:     Standardized to IDMS reference method     AST   Date Value Ref Range Status   07/17/2023 15 13 - 39 U/L Final   05/04/2023 8 5 - 45 U/L Final     Comment:     Specimen collection should occur prior to Sulfasalazine administration due to the potential for falsely depressed results. ALT   Date Value Ref Range Status   07/17/2023 6 (L) 7 - 52 U/L Final     Comment:     Specimen collection should occur prior to Sulfasalazine administration due to the potential for falsely depressed results. 05/04/2023 14 12 - 78 U/L Final     Comment:     Specimen collection should occur prior to Sulfasalazine and/or Sulfapyridine administration due to the potential for falsely depressed results.            Mathew Chris MD  7/18/2023  6:59 AM

## 2023-07-18 NOTE — ASSESSMENT & PLAN NOTE
QBL 1387, S/p Methergine and pitocin   Hgb 10.8-->7.2-->6.7, venofer given   Patient symptomatic this AM : S/P 1unit PRBC transfusion--> Hgb 7.9  Adequate urine output

## 2023-07-19 ENCOUNTER — APPOINTMENT (INPATIENT)
Dept: VASCULAR ULTRASOUND | Facility: HOSPITAL | Age: 28
DRG: 560 | End: 2023-07-19
Payer: COMMERCIAL

## 2023-07-19 VITALS
TEMPERATURE: 98.4 F | OXYGEN SATURATION: 98 % | SYSTOLIC BLOOD PRESSURE: 122 MMHG | BODY MASS INDEX: 32.95 KG/M2 | WEIGHT: 193 LBS | HEIGHT: 64 IN | RESPIRATION RATE: 20 BRPM | HEART RATE: 85 BPM | DIASTOLIC BLOOD PRESSURE: 70 MMHG

## 2023-07-19 LAB
ABO GROUP BLD BPU: NORMAL
ABO GROUP BLD BPU: NORMAL
BASOPHILS # BLD AUTO: 0.07 THOUSANDS/ÂΜL (ref 0–0.1)
BASOPHILS NFR BLD AUTO: 0 % (ref 0–1)
BPU ID: NORMAL
BPU ID: NORMAL
CROSSMATCH: NORMAL
CROSSMATCH: NORMAL
EOSINOPHIL # BLD AUTO: 0.16 THOUSAND/ÂΜL (ref 0–0.61)
EOSINOPHIL NFR BLD AUTO: 1 % (ref 0–6)
ERYTHROCYTE [DISTWIDTH] IN BLOOD BY AUTOMATED COUNT: 20 % (ref 11.6–15.1)
ERYTHROCYTE [DISTWIDTH] IN BLOOD BY AUTOMATED COUNT: 20.3 % (ref 11.6–15.1)
HCT VFR BLD AUTO: 25.6 % (ref 34.8–46.1)
HCT VFR BLD AUTO: 27.5 % (ref 34.8–46.1)
HGB BLD-MCNC: 7.9 G/DL (ref 11.5–15.4)
HGB BLD-MCNC: 8.3 G/DL (ref 11.5–15.4)
IMM GRANULOCYTES # BLD AUTO: 0.22 THOUSAND/UL (ref 0–0.2)
IMM GRANULOCYTES NFR BLD AUTO: 1 % (ref 0–2)
LYMPHOCYTES # BLD AUTO: 5.72 THOUSANDS/ÂΜL (ref 0.6–4.47)
LYMPHOCYTES NFR BLD AUTO: 30 % (ref 14–44)
MCH RBC QN AUTO: 22.9 PG (ref 26.8–34.3)
MCH RBC QN AUTO: 23.3 PG (ref 26.8–34.3)
MCHC RBC AUTO-ENTMCNC: 30.2 G/DL (ref 31.4–37.4)
MCHC RBC AUTO-ENTMCNC: 30.9 G/DL (ref 31.4–37.4)
MCV RBC AUTO: 76 FL (ref 82–98)
MCV RBC AUTO: 76 FL (ref 82–98)
MONOCYTES # BLD AUTO: 1.18 THOUSAND/ÂΜL (ref 0.17–1.22)
MONOCYTES NFR BLD AUTO: 6 % (ref 4–12)
NEUTROPHILS # BLD AUTO: 11.69 THOUSANDS/ÂΜL (ref 1.85–7.62)
NEUTS SEG NFR BLD AUTO: 62 % (ref 43–75)
NRBC BLD AUTO-RTO: 0 /100 WBCS
PLATELET # BLD AUTO: 234 THOUSANDS/UL (ref 149–390)
PLATELET # BLD AUTO: 240 THOUSANDS/UL (ref 149–390)
PMV BLD AUTO: 11.2 FL (ref 8.9–12.7)
PMV BLD AUTO: 11.5 FL (ref 8.9–12.7)
RBC # BLD AUTO: 3.39 MILLION/UL (ref 3.81–5.12)
RBC # BLD AUTO: 3.63 MILLION/UL (ref 3.81–5.12)
UNIT DISPENSE STATUS: NORMAL
UNIT DISPENSE STATUS: NORMAL
UNIT PRODUCT CODE: NORMAL
UNIT PRODUCT CODE: NORMAL
UNIT PRODUCT VOLUME: 350 ML
UNIT PRODUCT VOLUME: 350 ML
UNIT RH: NORMAL
UNIT RH: NORMAL
WBC # BLD AUTO: 19.04 THOUSAND/UL (ref 4.31–10.16)
WBC # BLD AUTO: 22.67 THOUSAND/UL (ref 4.31–10.16)

## 2023-07-19 PROCEDURE — 93970 EXTREMITY STUDY: CPT

## 2023-07-19 PROCEDURE — 93970 EXTREMITY STUDY: CPT | Performed by: SURGERY

## 2023-07-19 PROCEDURE — 85025 COMPLETE CBC W/AUTO DIFF WBC: CPT | Performed by: OBSTETRICS & GYNECOLOGY

## 2023-07-19 PROCEDURE — 99024 POSTOP FOLLOW-UP VISIT: CPT | Performed by: OBSTETRICS & GYNECOLOGY

## 2023-07-19 RX ORDER — ACETAMINOPHEN 325 MG/1
650 TABLET ORAL EVERY 4 HOURS PRN
Refills: 0
Start: 2023-07-19

## 2023-07-19 RX ORDER — IBUPROFEN 600 MG/1
600 TABLET ORAL EVERY 6 HOURS PRN
Qty: 30 TABLET | Refills: 0
Start: 2023-07-19

## 2023-07-19 RX ORDER — ACETAMINOPHEN AND CODEINE PHOSPHATE 120; 12 MG/5ML; MG/5ML
1 SOLUTION ORAL DAILY
Qty: 28 TABLET | Refills: 0 | Status: SHIPPED | OUTPATIENT
Start: 2023-07-19 | End: 2023-08-16

## 2023-07-19 RX ADMIN — DOCUSATE SODIUM 100 MG: 100 CAPSULE, LIQUID FILLED ORAL at 10:04

## 2023-07-19 RX ADMIN — SENNOSIDES 8.6 MG: 8.6 TABLET, FILM COATED ORAL at 10:04

## 2023-07-19 NOTE — PLAN OF CARE
Problem: Knowledge Deficit  Goal: Verbalizes understanding of labor plan  Description: Assess patient/family/caregiver's baseline knowledge level and ability to understand information. Provide education via patient/family/caregiver's preferred learning method at appropriate level of understanding. 1. Provide teaching at level of understanding. 2. Provide teaching via preferred learning method(s). Outcome: Progressing  Goal: Patient/family/caregiver demonstrates understanding of disease process, treatment plan, medications, and discharge instructions  Description: Complete learning assessment and assess knowledge base. Interventions:  - Provide teaching at level of understanding  - Provide teaching via preferred learning methods  Outcome: Progressing     Problem: Labor & Delivery  Goal: Manages discomfort  Description: Assess and monitor for signs and symptoms of discomfort. Assess patient's pain level regularly and per hospital policy. Administer medications as ordered. Support use of nonpharmacological methods to help control pain such as distraction, imagery, relaxation, and application of heat and cold. Collaborate with interdisciplinary team and patient to determine appropriate pain management plan. 1. Include patient in decisions related to comfort. 2. Offer non-pharmacological pain management interventions. 3. Report ineffective pain management to physician. Outcome: Progressing  Goal: Patient vital signs are stable  Description: 1. Assess vital signs - vaginal delivery.   Outcome: Progressing     Problem: BIRTH - VAGINAL/ SECTION  Goal: Fetal and maternal status remain reassuring during the birth process  Description: INTERVENTIONS:  - Monitor vital signs  - Monitor fetal heart rate  - Monitor uterine activity  - Monitor labor progression (vaginal delivery)  - DVT prophylaxis  - Antibiotic prophylaxis  Outcome: Progressing  Goal: Emotionally satisfying birthing experience for mother/fetus  Description: Interventions:  - Assess, plan, implement and evaluate the nursing care given to the patient in labor  - Advocate the philosophy that each childbirth experience is a unique experience and support the family's chosen level of involvement and control during the labor process   - Actively participate in both the patient's and family's teaching of the birth process  - Consider cultural, Hindu and age-specific factors and plan care for the patient in labor  Outcome: Progressing     Problem: PAIN - ADULT  Goal: Verbalizes/displays adequate comfort level or baseline comfort level  Description: Interventions:  - Encourage patient to monitor pain and request assistance  - Assess pain using appropriate pain scale  - Administer analgesics based on type and severity of pain and evaluate response  - Implement non-pharmacological measures as appropriate and evaluate response  - Consider cultural and social influences on pain and pain management  - Notify physician/advanced practitioner if interventions unsuccessful or patient reports new pain  Outcome: Progressing     Problem: INFECTION - ADULT  Goal: Absence or prevention of progression during hospitalization  Description: INTERVENTIONS:  - Assess and monitor for signs and symptoms of infection  - Monitor lab/diagnostic results  - Monitor all insertion sites, i.e. indwelling lines, tubes, and drains  - Monitor endotracheal if appropriate and nasal secretions for changes in amount and color  - [place] appropriate cooling/warming therapies per order  - Administer medications as ordered  - Instruct and encourage patient and family to use good hand hygiene technique  - Identify and instruct in appropriate isolation precautions for identified infection/condition  Outcome: Progressing  Goal: Absence of fever/infection during neutropenic period  Description: INTERVENTIONS:  - Monitor WBC    Outcome: Progressing     Problem: SAFETY ADULT  Goal: Patient will remain free of falls  Description: INTERVENTIONS:  - Educate patient/family on patient safety including physical limitations  - Instruct patient to call for assistance with activity   - Consult OT/PT to assist with strengthening/mobility   - Keep Call bell within reach  - Keep bed low and locked with side rails adjusted as appropriate  - Keep care items and personal belongings within reach  - Initiate and maintain comfort rounds  - Make Fall Risk Sign visible to staff  - Offer Toileting every Hours, in advance of need  - Initiate/Maintain alarm  - Obtain necessary fall risk management equipment:  - Apply yellow socks and bracelet for high fall risk patients  - Consider moving patient to room near nurses station  Outcome: Progressing  Goal: Maintain or return to baseline ADL function  Description: INTERVENTIONS:  -  Assess patient's ability to carry out ADLs; assess patient's baseline for ADL function and identify physical deficits which impact ability to perform ADLs (bathing, care of mouth/teeth, toileting, grooming, dressing, etc.)  - Assess/evaluate cause of self-care deficits   - Assess range of motion  - Assess patient's mobility; develop plan if impaired  - Assess patient's need for assistive devices and provide as appropriate  - Encourage maximum independence but intervene and supervise when necessary  - Involve family in performance of ADLs  - Assess for home care needs following discharge   - Consider OT consult to assist with ADL evaluation and planning for discharge  - Provide patient education as appropriate  Outcome: Progressing  Goal: Maintains/Returns to pre admission functional level  Description: INTERVENTIONS:  - Perform BMAT or MOVE assessment daily.   - Set and communicate daily mobility goal to care team and patient/family/caregiver. - Collaborate with rehabilitation services on mobility goals if consulted  - Perform Range of Motion times a day. - Reposition patient every hours.   - Dangle patient times a day  - Stand patient times a day  - Ambulate patient times a day  - Out of bed to chair times a day   - Out of bed for meals times a day  - Out of bed for toileting  - Record patient progress and toleration of activity level   Outcome: Progressing     Problem: DISCHARGE PLANNING  Goal: Discharge to home or other facility with appropriate resources  Description: INTERVENTIONS:  - Identify barriers to discharge w/patient and caregiver  - Arrange for needed discharge resources and transportation as appropriate  - Identify discharge learning needs (meds, wound care, etc.)  - Arrange for interpretive services to assist at discharge as needed  - Refer to Case Management Department for coordinating discharge planning if the patient needs post-hospital services based on physician/advanced practitioner order or complex needs related to functional status, cognitive ability, or social support system  Outcome: Progressing

## 2023-07-19 NOTE — PROGRESS NOTES
Progress Note - OB/GYN   28 y.o. female MRN: [id number]  Unit/Bed#:  321-01 Encounter: [id number]    Assessment and Plan      is a patient of: [address] [place] [address]. She is PPD# 2 s/p  complicated by postpartum hemorrhage. She is stable       *  (spontaneous vaginal delivery) at 37 weeks   Assessment & Plan  Lochia WNL   Recovering well   Appropriate bowel and bladder function   Pain well controlled   Tolerating diet   Breastfeeding: plans to but bottlefed last night   Ambulating without issues   No lower extremity tenderness  GBS positive  Rh positive   Right calf tenderness: consider Doppler US  Contraception: micronor    Postpartum hemorrhage  Assessment & Plan  QBL 1387, S/p Methergine and pitocin   Hgb 10.8-->7.2-->6.7, venofer given   Patient symptomatic this AM : S/P 1unit PRBC transfusion--> Hgb 7.9  Adequate urine output    Non-reassuring fetal heart rate with late deceleration  Assessment & Plan    Overall moderate variability, however due to decelerations at term patient admitted for delivery    GBS (group B streptococcus) infection  Assessment & Plan  S/P PCN    Cervical polyp  Assessment & Plan  2 cervical polyps noted during prenatal appointments  No hx of bleeding    Obesity in pregnancy, antepartum  Assessment & Plan  BMI 33    Anemia during pregnancy in third trimester  Assessment & Plan  On po iron supplementation       Disposition    - Anticipate discharge home on PPD# 2-3      Subjective/Objective     Chief Complaint: Postpartum State     Subjective:     is PPD#2 s/p . She reports right calf tenderness  , Incisional and abdominal Pain is well controlled. Patient is currently voiding. She is ambulating. She denies dizziness, palpitation. Patient is currently passing flatus and has had no bowel movement. She is tolerating PO, and denies nausea or vomitting.  Patient denies fever, chills, chest pain, shortness of breath, or calf tenderness. Lochia is normal. She is  Breastfeeding. She is recovering well and is stable. Vitals:   /60 (BP Location: Right arm)   Pulse 85   Temp 97.7 °F (36.5 °C) (Oral)   Resp 20   Ht 5' 4" (1.626 m)   Wt 87.5 kg (193 lb)   LMP 10/25/2022 (Exact Date)   SpO2 98%   Breastfeeding Yes   BMI 33.13 kg/m²       Intake/Output Summary (Last 24 hours) at 7/19/2023 0538  Last data filed at 7/18/2023 2143  Gross per 24 hour   Intake 700 ml   Output 375 ml   Net 325 ml       Invasive Devices     Peripheral Intravenous Line  Duration           Peripheral IV 07/18/23 Left Antecubital <1 day                Physical Exam:   GEN: Abbey Hinojosa appears well, alert and oriented x 3, pleasant and cooperative   CARDIO: RRR, no murmurs or rubs  RESP:  CTAB, no wheezes or rales  ABDOMEN: soft, no tenderness, no distention, fundus @ 2 cm below u   EXTREMITIES: non tender, no erythema      Labs:     Hemoglobin   Date Value Ref Range Status   07/18/2023 7.9 (L) 11.5 - 15.4 g/dL Final   07/18/2023 6.8 (LL) 11.5 - 15.4 g/dL Final     WBC   Date Value Ref Range Status   07/18/2023 22.67 (H) 4.31 - 10.16 Thousand/uL Final   07/18/2023 21.42 (H) 4.31 - 10.16 Thousand/uL Final     Platelets   Date Value Ref Range Status   07/18/2023 234 149 - 390 Thousands/uL Final   07/18/2023 225 149 - 390 Thousands/uL Final     Creatinine   Date Value Ref Range Status   07/17/2023 0.77 0.60 - 1.30 mg/dL Final     Comment:     Standardized to IDMS reference method   05/04/2023 0.42 (L) 0.60 - 1.30 mg/dL Final     Comment:     Standardized to IDMS reference method     AST   Date Value Ref Range Status   07/17/2023 15 13 - 39 U/L Final   05/04/2023 8 5 - 45 U/L Final     Comment:     Specimen collection should occur prior to Sulfasalazine administration due to the potential for falsely depressed results.       ALT   Date Value Ref Range Status   07/17/2023 6 (L) 7 - 52 U/L Final     Comment:     Specimen collection should occur prior to Sulfasalazine administration due to the potential for falsely depressed results. 05/04/2023 14 12 - 78 U/L Final     Comment:     Specimen collection should occur prior to Sulfasalazine and/or Sulfapyridine administration due to the potential for falsely depressed results.            Pierre Rodriguez MD  7/19/2023  5:38 AM

## 2023-07-19 NOTE — LACTATION NOTE
This note was copied from a baby's chart. CONSULT - LACTATION  Baby Boy Tra Sanchez) Cullen Loser 2 days male MRN: [id number]    [address] [facility] Room / Bed: (N)/(N) Encounter: [id number]    Maternal Information     MOTHER:  Ceiclia Elizalde  Maternal Age: 28 y.o.   OB History: # 1 - Date: 23, Sex: Male, Weight: 3275 g (7 lb 3.5 oz), GA: 37w6d, Delivery: None, Apgar1: 8, Apgar5: 9, Living: Living, Birth Comments: None   Previouse breast reduction surgery? No    Lactation history:   Has patient previously breast fed: No   How long had patient previously breast fed:     Previous breast feeding complications:     History reviewed. No pertinent surgical history. Birth information:  YOB: 2024   Time of birth: 5:30 PM   Sex: male   Delivery type:     Birth Weight: 3275 g (7 lb 3.5 oz)   Percent of Weight Change: 0%     Gestational Age: 38w9d   [unfilled]    Assessment     Breast and nipple assessment: normal assessment    [place] Assessment: normal assessment    Feeding assessment: feeding well  LATCH:  Latch: Grasps breast, tongue down, lips flanged, rhythmic sucking   Audible Swallowing: Spontaneous and intermittent (24 hours old)   Type of Nipple: Everted (After stimulation)   Comfort (Breast/Nipple): Filling, red/small blisters/bruises, mild/moderate discomfort   Hold (Positioning): No assist from staff, mother able to position/hold infant   LATCH Score: 9         Having latch problems? No   Breasts/Nipples   Left Breast Filling   Right Breast Filling   Left Nipple Everted   Right Nipple Everted   Intervention Breast pump  (Demonstrated how to use manual breast pump)   Breastfeeding Progress Breastfeeding well; Not yet established   Reasons for not Breastfeeding Maternal preference  (using formula intermittently.)   Patient Follow-Up   Lactation Consult Status 2   Follow-Up Type Inpatient;Call as needed   Other OB Lactation Documentation    Additional Problem Noted Hand pump demonstrated with family as they do not qualify for insurance benefits provided breast pump. Encouraged delayed use of pacifier. Encouraged feeding EBM instead of formula when desiring bottle feeding. (Encouraged family to review D/C booklet in Serbian given yesterday.  [id number] utilized for this interaction.)        Feeding recommendations:  breast feed on demand     Encouraged parents to call for assistance, questions, and concerns about breastfeeding. Extension provided.       Gaudencio Tejeda RN 7/19/2023 12:40 PM

## 2023-07-19 NOTE — PLAN OF CARE
Problem: Knowledge Deficit  Goal: Verbalizes understanding of labor plan  Description: Assess patient/family/caregiver's baseline knowledge level and ability to understand information. Provide education via patient/family/caregiver's preferred learning method at appropriate level of understanding. 1. Provide teaching at level of understanding. 2. Provide teaching via preferred learning method(s). 7/19/2023 0125 by Jorge Vegas RN  Outcome: Progressing  7/19/2023 0124 by Jorge Vegas RN  Outcome: Progressing  Goal: Patient/family/caregiver demonstrates understanding of disease process, treatment plan, medications, and discharge instructions  Description: Complete learning assessment and assess knowledge base.   Interventions:  - Provide teaching at level of understanding  - Provide teaching via preferred learning methods  7/19/2023 0125 by Jorge Vegas RN  Outcome: Progressing  7/19/2023 0124 by Jorge Vegas RN  Outcome: Progressing     Problem: PAIN - ADULT  Goal: Verbalizes/displays adequate comfort level or baseline comfort level  Description: Interventions:  - Encourage patient to monitor pain and request assistance  - Assess pain using appropriate pain scale  - Administer analgesics based on type and severity of pain and evaluate response  - Implement non-pharmacological measures as appropriate and evaluate response  - Consider cultural and social influences on pain and pain management  - Notify physician/advanced practitioner if interventions unsuccessful or patient reports new pain  7/19/2023 0125 by Jorge Vegas RN  Outcome: Progressing  7/19/2023 0124 by Jorge Vegas RN  Outcome: Progressing     Problem: INFECTION - ADULT  Goal: Absence or prevention of progression during hospitalization  Description: INTERVENTIONS:  - Assess and monitor for signs and symptoms of infection  - Monitor lab/diagnostic results  - Monitor all insertion sites, i.e. indwelling lines, tubes, and drains  - Monitor endotracheal if appropriate and nasal secretions for changes in amount and color  - Clark appropriate cooling/warming therapies per order  - Administer medications as ordered  - Instruct and encourage patient and family to use good hand hygiene technique  - Identify and instruct in appropriate isolation precautions for identified infection/condition  7/19/2023 0125 by Ken Tompkins RN  Outcome: Progressing  7/19/2023 0124 by Ken Tompkins RN  Outcome: Progressing  Goal: Absence of fever/infection during neutropenic period  Description: INTERVENTIONS:  - Monitor WBC    7/19/2023 0125 by Ken Tompkins RN  Outcome: Progressing  7/19/2023 0124 by Ken Tompkins RN  Outcome: Progressing     Problem: SAFETY ADULT  Goal: Patient will remain free of falls  Description: INTERVENTIONS:  - Educate patient/family on patient safety including physical limitations  - Instruct patient to call for assistance with activity   - Consult OT/PT to assist with strengthening/mobility   - Keep Call bell within reach  - Keep bed low and locked with side rails adjusted as appropriate  - Keep care items and personal belongings within reach  - Initiate and maintain comfort rounds  - Make Fall Risk Sign visible to staff  - Offer Toileting every  Hours, in advance of need  - Initiate/Maintain alarm  - Obtain necessary fall risk management equipment:   - Apply yellow socks and bracelet for high fall risk patients  - Consider moving patient to room near nurses station  7/19/2023 0125 by Ken Tompkins RN  Outcome: Progressing  7/19/2023 0124 by Ken Tompkins RN  Outcome: Progressing  Goal: Maintain or return to baseline ADL function  Description: INTERVENTIONS:  -  Assess patient's ability to carry out ADLs; assess patient's baseline for ADL function and identify physical deficits which impact ability to perform ADLs (bathing, care of mouth/teeth, toileting, grooming, dressing, etc.)  - Assess/evaluate cause of self-care deficits   - Assess range of motion  - Assess patient's mobility; develop plan if impaired  - Assess patient's need for assistive devices and provide as appropriate  - Encourage maximum independence but intervene and supervise when necessary  - Involve family in performance of ADLs  - Assess for home care needs following discharge   - Consider OT consult to assist with ADL evaluation and planning for discharge  - Provide patient education as appropriate  7/19/2023 0125 by Juice Hsu RN  Outcome: Progressing  7/19/2023 0124 by Juice Hsu RN  Outcome: Progressing  Goal: Maintains/Returns to pre admission functional level  Description: INTERVENTIONS:  - Perform BMAT or MOVE assessment daily.   - Set and communicate daily mobility goal to care team and patient/family/caregiver.    - Collaborate with rehabilitation services on mobility goals if consulted  - Out of bed for toileting  - Record patient progress and toleration of activity level   7/19/2023 0125 by Juice Hsu RN  Outcome: Progressing  7/19/2023 0124 by Juice Hsu RN  Outcome: Progressing     Problem: DISCHARGE PLANNING  Goal: Discharge to home or other facility with appropriate resources  Description: INTERVENTIONS:  - Identify barriers to discharge w/patient and caregiver  - Arrange for needed discharge resources and transportation as appropriate  - Identify discharge learning needs (meds, wound care, etc.)  - Arrange for interpretive services to assist at discharge as needed  - Refer to Case Management Department for coordinating discharge planning if the patient needs post-hospital services based on physician/advanced practitioner order or complex needs related to functional status, cognitive ability, or social support system  7/19/2023 0125 by Juice Hsu RN  Outcome: Progressing  7/19/2023 0124 by Juice Hsu RN  Outcome: Progressing     Problem: POSTPARTUM  Goal: Experiences normal postpartum course  Description: INTERVENTIONS:  - Monitor maternal vital signs  - Assess uterine involution and lochia  Outcome: Progressing  Goal: Appropriate maternal -  bonding  Description: INTERVENTIONS:  - Identify family support  - Assess for appropriate maternal/infant bonding   -Encourage maternal/infant bonding opportunities  - Referral to  or  as needed  Outcome: Progressing  Goal: Establishment of infant feeding pattern  Description: INTERVENTIONS:  - Assess breast/bottle feeding  - Refer to lactation as needed  Outcome: Progressing  Goal: Incision(s), wounds(s) or drain site(s) healing without S/S of infection  Description: INTERVENTIONS  - Assess and document dressing, incision, wound bed, drain sites and surrounding tissue  - Provide patient and family education  - Perform skin care/dressing changes every   Outcome: Progressing

## 2023-07-20 ENCOUNTER — TELEPHONE (OUTPATIENT)
Dept: OBGYN CLINIC | Facility: CLINIC | Age: 28
End: 2023-07-20

## 2023-07-20 PROBLEM — O13.9 GESTATIONAL HYPERTENSION: Status: ACTIVE | Noted: 2023-07-20

## 2023-07-23 LAB — PLACENTA IN STORAGE: NORMAL

## 2023-07-24 NOTE — ANESTHESIA POSTPROCEDURE EVALUATION
Post-Op Assessment Note    CV Status:  Stable  Pain Score: 0    Pain management: adequate     Mental Status:  Alert and awake   Hydration Status:  Euvolemic   PONV Controlled:  Controlled   Airway Patency:  Patent      Post Op Vitals Reviewed: Yes      Staff: Anesthesiologist         No notable events documented.     BP      Temp      Pulse     Resp      SpO2

## 2024-04-04 ENCOUNTER — ANNUAL EXAM (OUTPATIENT)
Dept: OBGYN CLINIC | Facility: CLINIC | Age: 29
End: 2024-04-04

## 2024-04-04 VITALS
DIASTOLIC BLOOD PRESSURE: 72 MMHG | HEART RATE: 91 BPM | HEIGHT: 64 IN | BODY MASS INDEX: 29.02 KG/M2 | SYSTOLIC BLOOD PRESSURE: 113 MMHG | WEIGHT: 170 LBS | RESPIRATION RATE: 18 BRPM

## 2024-04-04 DIAGNOSIS — Z20.2 POSSIBLE EXPOSURE TO STD: ICD-10-CM

## 2024-04-04 DIAGNOSIS — Z75.8 LANGUAGE BARRIER: ICD-10-CM

## 2024-04-04 DIAGNOSIS — Z60.3 LANGUAGE BARRIER: ICD-10-CM

## 2024-04-04 DIAGNOSIS — Z01.419 ENCOUNTER FOR GYNECOLOGICAL EXAMINATION WITHOUT ABNORMAL FINDING: Primary | ICD-10-CM

## 2024-04-04 DIAGNOSIS — Z86.2 HISTORY OF ANEMIA: ICD-10-CM

## 2024-04-04 PROBLEM — A49.1 GBS (GROUP B STREPTOCOCCUS) INFECTION: Status: RESOLVED | Noted: 2023-07-09 | Resolved: 2024-04-04

## 2024-04-04 PROBLEM — O13.9 GESTATIONAL HYPERTENSION: Status: RESOLVED | Noted: 2023-07-20 | Resolved: 2024-04-04

## 2024-04-04 PROBLEM — O99.013 ANEMIA DURING PREGNANCY IN THIRD TRIMESTER: Status: RESOLVED | Noted: 2023-01-26 | Resolved: 2024-04-04

## 2024-04-04 PROBLEM — O99.210 OBESITY IN PREGNANCY, ANTEPARTUM: Status: RESOLVED | Noted: 2023-01-26 | Resolved: 2024-04-04

## 2024-04-04 PROBLEM — O36.8390 NON-REASSURING FETAL HEART RATE WITH LATE DECELERATION: Status: RESOLVED | Noted: 2023-07-16 | Resolved: 2024-04-04

## 2024-04-04 PROCEDURE — 99395 PREV VISIT EST AGE 18-39: CPT | Performed by: NURSE PRACTITIONER

## 2024-04-04 PROCEDURE — 87491 CHLMYD TRACH DNA AMP PROBE: CPT | Performed by: NURSE PRACTITIONER

## 2024-04-04 PROCEDURE — 87591 N.GONORRHOEAE DNA AMP PROB: CPT | Performed by: NURSE PRACTITIONER

## 2024-04-04 SDOH — SOCIAL STABILITY - SOCIAL INSECURITY: ACCULTURATION DIFFICULTY: Z60.3

## 2024-04-04 NOTE — PROGRESS NOTES
ASSESSMENT & PLAN: Cecilia Elizalde is a 28 y.o.  with normal gynecologic exam.    1.  Routine well woman exam done today  2.  Pap and HPV:  The patient's last pap was 2023.    It was normal.    Pap was not done today.    Current ASCCP Guidelines reviewed.   3.  The following were reviewed in today's visit: breast self exam, STD testing, family planning choices, adequate intake of calcium and vitamin D, exercise, and healthy diet.  4. Gardisil vaccine in women up to age 45 discussed and information was provided.    5. Check CBC  for hx of anemia.     CC:  Annual Gynecologic Examination    HPI: Cecilia Elizalde is a 28 y.o.  who presents for annual gynecologic examination.  [place] interpretation done by [id number].   She had an  2023,, pregnancy complicated by gestational hypertension, postpartum hemorrhage cervical polyp, anemia, obesity.  Has not been seen in the office since she delivered. Her last hgb was 8.3 on 23  She has the following concerns:  she is getting liposuction in the [place] 2024.   She will RTO for contraception following her surgery.     Health Maintenance:    She wears her seatbelt routinely.    She does perform regular monthly self breast exams.    She feels safe at home.     Past Medical History:   Diagnosis Date    Anemia     Migraines     Obesity in pregnancy, antepartum 2023    Early 1 hour ordered  ASA until 36 weeks    Polycystic ovary syndrome        History reviewed. No pertinent surgical history.    Past OB/Gyn History:  OB History          1    Para   1    Term   1       0    AB   0    Living   1         SAB   0    IAB   0    Ectopic   0    Multiple   0    Live Births   1               Pt does not have menstrual issues. Every 28 d x7 d   History of sexually transmitted infection: No.  History of abnormal pap smears: No .    Patient is currently sexually active.  heterosexual.  The current method of family planning is  none.    Family History   Problem Relation Age of Onset    No Known Problems Mother     No Known Problems Father     No Known Problems Sister     No Known Problems Sister     No Known Problems Brother     No Known Problems Brother     Diabetes Maternal Grandmother     Diabetes Paternal Grandmother     Breast cancer Neg Hx     Colon cancer Neg Hx     Ovarian cancer Neg Hx        Social History:  Social History     Socioeconomic History    Marital status: /Civil Union     Spouse name: Not on file    Number of children: Not on file    Years of education: Not on file    Highest education level: Not on file   Occupational History    Not on file   Tobacco Use    Smoking status: Never    Smokeless tobacco: Never   Vaping Use    Vaping status: Never Used   Substance and Sexual Activity    Alcohol use: Not Currently    Drug use: Never    Sexual activity: Yes     Partners: Male     Birth control/protection: None   Other Topics Concern    Not on file   Social History Narrative    Not on file     Social Determinants of Health     Financial Resource Strain: Low Risk  (4/4/2024)    Overall Financial Resource Strain (CARDIA)     Difficulty of Paying Living Expenses: Not hard at all   Food Insecurity: No Food Insecurity (4/4/2024)    Hunger Vital Sign     Worried About Running Out of Food in the Last Year: Never true     Ran Out of Food in the Last Year: Never true   Transportation Needs: No Transportation Needs (4/4/2024)    PRAPARE - Transportation     Lack of Transportation (Medical): No     Lack of Transportation (Non-Medical): No   Physical Activity: Not on file   Stress: No Stress Concern Present (12/29/2022)    [employer] [place] [employer] - Occupational Stress Questionnaire     Feeling of Stress : Only a little   Social Connections: Moderately Isolated (12/29/2022)    Social Connection and Isolation Panel [NHANES]     Frequency of Communication with Friends and Family: More than three times a week      Frequency of Social Gatherings with Friends and Family: Once a week     Attends Taoist Services: Never     Active Member of Clubs or Organizations: No     Attends Club or Organization Meetings: Never     Marital Status: Living with partner   Intimate Partner Violence: Not At Risk (12/29/2022)    Humiliation, Afraid, Rape, and Kick questionnaire     Fear of Current or Ex-Partner: No     Emotionally Abused: No     Physically Abused: No     Sexually Abused: No   Housing Stability: Low Risk  (4/4/2024)    Housing Stability Vital Sign     Unable to Pay for Housing in the Last Year: No     Number of Places Lived in the Last Year: 1     Unstable Housing in the Last Year: No     Presently lives with family.  Patient is .  Patient is currently employed     No Known Allergies      Current Outpatient Medications:     acetaminophen (TYLENOL) 325 mg tablet, Take 2 tablets (650 mg total) by mouth every 4 (four) hours as needed for mild pain, Disp: , Rfl: 0    ibuprofen (MOTRIN) 600 mg tablet, Take 1 tablet (600 mg total) by mouth every 6 (six) hours as needed for mild pain, Disp: 30 tablet, Rfl: 0    aspirin 81 mg chewable tablet, Chew 2 tablets (162 mg total) daily (Patient not taking: Reported on 4/4/2024), Disp: 180 tablet, Rfl: 3    benzocaine-menthol-lanolin-aloe (DERMOPLAST) 20-0.5 % topical spray, Apply 1 Application topically 4 (four) times a day as needed for mild pain (Patient not taking: Reported on 4/4/2024), Disp: , Rfl: 0    ferrous sulfate 324 (65 Fe) mg, Take 1 tablet (324 mg total) by mouth 2 (two) times a day before meals (Patient not taking: Reported on 4/4/2024), Disp: 30 tablet, Rfl: 3    lidocaine (Lidoderm) 5 %, Apply 1 patch topically over 12 hours daily Remove & Discard patch within 12 hours or as directed by MD (Patient not taking: Reported on 4/4/2024), Disp: 10 patch, Rfl: 0    norethindrone (MICRONOR) 0.35 MG tablet, Take 1 tablet (0.35 mg total) by mouth daily for 28 days, Disp: 28 tablet,  "Rfl: 0    Prenatal Vit-Fe Fumarate-FA (SM Prenatal Vitamins) 28-0.8 MG TABS, Take 1 tablet by mouth in the morning (Patient not taking: Reported on 4/4/2024), Disp: 90 tablet, Rfl: 3      Review of Systems  Constitutional :no fever, feels well, no tiredness, no recent weight gain or loss  ENT: no ear ache, no loss of hearing, no nosebleeds or nasal discharge, no sore throat or hoarseness.  Cardiovascular: no complaints of slow or fast heart beat, no chest pain, no palpitations, no leg claudication or lower extremity edema.  Respiratory: no complaints of shortness of shortness of breath, no DAILY  Breasts:no complaints of breast pain, breast lump, or nipple discharge  Gastrointestinal: no complaints of abdominal pain, constipation, nausea, vomiting, or diarrhea or bloody stools  Genitourinary : no complaints of dysuria, incontinence, pelvic pain, no dysmenorrhea, vaginal discharge or abnormal vaginal bleeding and as noted in HPI.  Musculoskeletal: no complaints of arthralgia, no myalgia, no joint swelling or stiffness, no limb pain or swelling.  Integumentary: no complaints of skin rash or lesion, itching or dry skin  Neurological: no complaints of headache, no confusion, no numbness or tingling, no dizziness or fainting    Objective      /72 (BP Location: Right arm, Patient Position: Sitting, Cuff Size: Adult)   Pulse 91   Resp 18   Ht 5' 4\" (1.626 m)   Wt 77.1 kg (170 lb)   LMP 03/04/2024 (Exact Date)   BMI 29.18 kg/m²   General:   appears stated age, cooperative, alert normal mood and affect   Neck: normal, supple,trachea midline, no masses   Heart: regular rate and rhythm, S1, S2 normal, no murmur, click, rub or gallop   Lungs: clear to auscultation bilaterally   Breasts: normal appearance, no masses or tenderness, Inspection negative, No nipple retraction or dimpling, No nipple discharge or bleeding, No axillary or supraclavicular adenopathy, Normal to palpation without dominant masses, Taught monthly " breast self examination   Abdomen: soft, non-tender, without masses or organomegaly   Vulva: Bartholin's, Urethra, [place] normal   Vagina: normal vagina, no discharge, exudate, lesion, or erythema   Urethra: normal   Cervix: Normal, no discharge. GCC done.   Uterus: normal size, contour, position, consistency, mobility, non-tender, anteverted, and mobile   Adnexa: normal adnexa and no mass, fullness, tenderness   Lymphatic palpation of lymph nodes in neck, axilla, groin and/or other locations: no lymphadenopathy or masses noted   Skin normal skin turgor and no rashes.   Psychiatric orientation to person, place, and time: normal. mood and affect: normal

## 2024-04-05 ENCOUNTER — APPOINTMENT (OUTPATIENT)
Dept: LAB | Facility: CLINIC | Age: 29
End: 2024-04-05

## 2024-04-05 LAB
BASOPHILS # BLD AUTO: 0.06 THOUSANDS/ÂΜL (ref 0–0.1)
BASOPHILS NFR BLD AUTO: 1 % (ref 0–1)
EOSINOPHIL # BLD AUTO: 0.07 THOUSAND/ÂΜL (ref 0–0.61)
EOSINOPHIL NFR BLD AUTO: 1 % (ref 0–6)
ERYTHROCYTE [DISTWIDTH] IN BLOOD BY AUTOMATED COUNT: 20.2 % (ref 11.6–15.1)
HCT VFR BLD AUTO: 38.3 % (ref 34.8–46.1)
HGB BLD-MCNC: 10.9 G/DL (ref 11.5–15.4)
IMM GRANULOCYTES # BLD AUTO: 0.02 THOUSAND/UL (ref 0–0.2)
IMM GRANULOCYTES NFR BLD AUTO: 0 % (ref 0–2)
LYMPHOCYTES # BLD AUTO: 3.3 THOUSANDS/ÂΜL (ref 0.6–4.47)
LYMPHOCYTES NFR BLD AUTO: 43 % (ref 14–44)
MCH RBC QN AUTO: 21.8 PG (ref 26.8–34.3)
MCHC RBC AUTO-ENTMCNC: 28.5 G/DL (ref 31.4–37.4)
MCV RBC AUTO: 77 FL (ref 82–98)
MONOCYTES # BLD AUTO: 0.55 THOUSAND/ÂΜL (ref 0.17–1.22)
MONOCYTES NFR BLD AUTO: 7 % (ref 4–12)
NEUTROPHILS # BLD AUTO: 3.62 THOUSANDS/ÂΜL (ref 1.85–7.62)
NEUTS SEG NFR BLD AUTO: 48 % (ref 43–75)
NRBC BLD AUTO-RTO: 0 /100 WBCS
PLATELET # BLD AUTO: 287 THOUSANDS/UL (ref 149–390)
PMV BLD AUTO: 11.9 FL (ref 8.9–12.7)
RBC # BLD AUTO: 5 MILLION/UL (ref 3.81–5.12)
WBC # BLD AUTO: 7.62 THOUSAND/UL (ref 4.31–10.16)

## 2024-04-05 PROCEDURE — 85025 COMPLETE CBC W/AUTO DIFF WBC: CPT | Performed by: NURSE PRACTITIONER

## 2024-04-05 PROCEDURE — 36415 COLL VENOUS BLD VENIPUNCTURE: CPT | Performed by: NURSE PRACTITIONER

## 2024-04-06 LAB
C TRACH DNA SPEC QL NAA+PROBE: NEGATIVE
N GONORRHOEA DNA SPEC QL NAA+PROBE: NEGATIVE

## 2024-04-08 ENCOUNTER — TELEPHONE (OUTPATIENT)
Dept: OBGYN CLINIC | Facility: CLINIC | Age: 29
End: 2024-04-08

## 2024-04-08 NOTE — TELEPHONE ENCOUNTER
----- Message from GIULIANA Lynn sent at 4/7/2024  4:37 PM EDT -----  Please inform pt that her culture for chlamydia and gonorrhea were negative. Her hgb improved to 10.9, recommend for her to take iron supplements 1 daily and increase her dietary iron with beans, meat, green vegetables, iron enriched cereals.  Thank You!

## 2024-05-04 PROBLEM — Z01.419 ENCOUNTER FOR GYNECOLOGICAL EXAMINATION WITHOUT ABNORMAL FINDING: Status: RESOLVED | Noted: 2024-04-04 | Resolved: 2024-05-04

## 2025-01-29 ENCOUNTER — HOSPITAL ENCOUNTER (EMERGENCY)
Facility: HOSPITAL | Age: 30
Discharge: HOME | End: 2025-01-29
Attending: EMERGENCY MEDICINE

## 2025-01-29 ENCOUNTER — APPOINTMENT (EMERGENCY)
Dept: RADIOLOGY | Facility: HOSPITAL | Age: 30
End: 2025-01-29

## 2025-01-29 VITALS
RESPIRATION RATE: 16 BRPM | OXYGEN SATURATION: 98 % | SYSTOLIC BLOOD PRESSURE: 142 MMHG | WEIGHT: 174.6 LBS | DIASTOLIC BLOOD PRESSURE: 80 MMHG | HEART RATE: 84 BPM | TEMPERATURE: 98.9 F | BODY MASS INDEX: 29.97 KG/M2

## 2025-01-29 DIAGNOSIS — R07.9 CHEST PAIN: Primary | ICD-10-CM

## 2025-01-29 LAB
BILIRUB UR QL STRIP: NEGATIVE
CARDIAC TROPONIN I PNL SERPL HS: <2 NG/L (ref ?–50)
CLARITY UR: CLEAR
COLOR UR: YELLOW
FLUAV AG UPPER RESP QL IA.RAPID: NEGATIVE
FLUBV AG UPPER RESP QL IA.RAPID: NEGATIVE
GLUCOSE UR STRIP-MCNC: NEGATIVE MG/DL
HGB UR QL STRIP.AUTO: NEGATIVE
KETONES UR STRIP-MCNC: NEGATIVE MG/DL
LEUKOCYTE ESTERASE UR QL STRIP: NEGATIVE
NITRITE UR QL STRIP: NEGATIVE
PH UR STRIP.AUTO: 7 [PH]
PROT UR STRIP-MCNC: NEGATIVE MG/DL
SARS-COV+SARS-COV-2 AG RESP QL IA.RAPID: NEGATIVE
SP GR UR STRIP.AUTO: 1.02 (ref 1–1.03)
UROBILINOGEN UR QL STRIP.AUTO: 0.2 E.U./DL

## 2025-01-29 PROCEDURE — 36415 COLL VENOUS BLD VENIPUNCTURE: CPT

## 2025-01-29 PROCEDURE — 87811 SARS-COV-2 COVID19 W/OPTIC: CPT

## 2025-01-29 PROCEDURE — 71045 X-RAY EXAM CHEST 1 VIEW: CPT

## 2025-01-29 PROCEDURE — 99285 EMERGENCY DEPT VISIT HI MDM: CPT | Performed by: EMERGENCY MEDICINE

## 2025-01-29 PROCEDURE — 99285 EMERGENCY DEPT VISIT HI MDM: CPT

## 2025-01-29 PROCEDURE — 81003 URINALYSIS AUTO W/O SCOPE: CPT

## 2025-01-29 PROCEDURE — 87804 INFLUENZA ASSAY W/OPTIC: CPT

## 2025-01-29 PROCEDURE — 93005 ELECTROCARDIOGRAM TRACING: CPT

## 2025-01-29 PROCEDURE — 84484 ASSAY OF TROPONIN QUANT: CPT

## 2025-01-30 LAB
ATRIAL RATE: 86 BPM
P AXIS: 71 DEGREES
PR INTERVAL: 156 MS
QRS AXIS: 81 DEGREES
QRSD INTERVAL: 82 MS
QT INTERVAL: 370 MS
QTC INTERVAL: 442 MS
T WAVE AXIS: 58 DEGREES
VENTRICULAR RATE: 86 BPM

## 2025-01-30 PROCEDURE — 93010 ELECTROCARDIOGRAM REPORT: CPT | Performed by: INTERNAL MEDICINE

## 2025-01-30 NOTE — DISCHARGE INSTRUCTIONS
Please take Tylenol and ibuprofen as needed for pain.    Please call to establish care with a primary care physician, referral has been placed.

## 2025-01-30 NOTE — ED PROVIDER NOTES
Time reflects when diagnosis was documented in both MDM as applicable and the Disposition within this note       Time User Action Codes Description Comment    1/29/2025 10:08 PM Juan Soliman Add [R07.9] Chest pain           ED Disposition       ED Disposition   Discharge    Condition   Stable    Date/Time   Wed Jan 29, 2025 10:08 PM    Comment   Cecilia Tonio discharge to home/self care.                   Assessment & Plan       Medical Decision Making  Patient presents with 30 minutes of sudden onset 9/10 chest pain that started after bathing her children.  VSS on arrival.  She also endorses 4/10 headache and congestion with abdominal pain over the last 1 to 2 days.  History was obtained via .  Favor anxiety versus costochondritis versus URI versus ACS versus PNA vs GERD.  Will obtain chest x-ray, troponins, EKG, UA, flu/COVID rapid antigen.  EKG shows NSR without evidence of ischemia.  Troponin WNL.  UA unremarkable.  CXR unremarkable on wet read.  Flu/COVID-negative.  She was deemed medically stable for discharge with recommendation to use Tylenol and ibuprofen as needed for pain.  Ambulatory referral to family medicine placed as patient does not have a documented PCP.    Amount and/or Complexity of Data Reviewed  Labs: ordered.  Radiology: ordered.             Medications - No data to display    ED Risk Strat Scores                      PERC Rule for PE      Flowsheet Row Most Recent Value   PERC Rule for PE    Age >=50 0 Filed at: 01/29/2025 2104   HR >=100 0 Filed at: 01/29/2025 2104   O2 Sat on room air < 95% 0 Filed at: 01/29/2025 2104   History of PE or DVT 0 Filed at: 01/29/2025 2104   Recent trauma or surgery 0 Filed at: 01/29/2025 2104   Hemoptysis 0 Filed at: 01/29/2025 2104   Exogenous estrogen 0 Filed at: 01/29/2025 2104   Unilateral leg swelling 0 Filed at: 01/29/2025 2104   PERC Rule for PE Results 0 Filed at: 01/29/2025 2104            SBIRT 20yo+      Flowsheet Row Most Recent Value    Initial Alcohol Screen: US AUDIT-C     1. How often do you have a drink containing alcohol? 0 Filed at: 01/29/2025 2048   3b. FEMALE Any Age, or MALE 65+: How often do you have 4 or more drinks on one occassion? 0 Filed at: 01/29/2025 2048   Audit-C Score 0 Filed at: 01/29/2025 2048   JEREMY: How many times in the past year have you...    Used an illegal drug or used a prescription medication for non-medical reasons? Never Filed at: 01/29/2025 2048                            History of Present Illness       Chief Complaint   Patient presents with    Chest Pain     Patient in the ED c/o chest pain and flu like symptoms with some SOB that started about 30 mins ago.        Past Medical History:   Diagnosis Date    Anemia     Gestational hypertension 07/20/2023    Migraines     Obesity in pregnancy, antepartum 01/26/2023    Early 1 hour ordered  ASA until 36 weeks    Polycystic ovary syndrome       History reviewed. No pertinent surgical history.   Family History   Problem Relation Age of Onset    No Known Problems Mother     No Known Problems Father     No Known Problems Sister     No Known Problems Sister     No Known Problems Brother     No Known Problems Brother     Diabetes Maternal Grandmother     Diabetes Paternal Grandmother     Breast cancer Neg Hx     Colon cancer Neg Hx     Ovarian cancer Neg Hx       Social History     Tobacco Use    Smoking status: Never    Smokeless tobacco: Never   Vaping Use    Vaping status: Never Used   Substance Use Topics    Alcohol use: Not Currently    Drug use: Never      E-Cigarette/Vaping    E-Cigarette Use Never User       E-Cigarette/Vaping Substances    Nicotine No     THC No     CBD No     Flavoring No     Other No     Unknown No       I have reviewed and agree with the history as documented.     Patient presents with 30 minutes of sudden onset 9/10 chest pain that is nonradiating after breathing her children this evening.  States she has never had chest pain previously.   She also endorses 4/10 headache without vision changes, head congestion with abdominal pain over the last 1 to 2 days.  At home she took ibuprofen without relief.      Chest Pain  Associated symptoms: abdominal pain, headache and shortness of breath    Associated symptoms: no fever, no nausea and not vomiting        Review of Systems   Constitutional:  Negative for chills and fever.   Respiratory:  Positive for shortness of breath.    Cardiovascular:  Positive for chest pain.   Gastrointestinal:  Positive for abdominal pain. Negative for diarrhea, nausea and vomiting.   Genitourinary:  Negative for dysuria and hematuria.   Neurological:  Positive for headaches.           Objective       ED Triage Vitals [01/29/25 2047]   Temperature Pulse Blood Pressure Respirations SpO2 Patient Position - Orthostatic VS   98.9 °F (37.2 °C) 84 142/80 16 98 % Lying      Temp Source Heart Rate Source BP Location FiO2 (%) Pain Score    Oral Monitor -- -- 9      Vitals      Date and Time Temp Pulse SpO2 Resp BP Pain Score FACES Pain Rating User   01/29/25 2047 98.9 °F (37.2 °C) 84 98 % 16 142/80 9 -- AN            Physical Exam  Constitutional:       Appearance: She is not ill-appearing, toxic-appearing or diaphoretic.      Comments: Tearful   Cardiovascular:      Rate and Rhythm: Normal rate and regular rhythm.      Heart sounds: Heart sounds not distant. No murmur heard.     No systolic murmur is present.   Pulmonary:      Effort: No tachypnea or respiratory distress.      Breath sounds: Normal breath sounds. No wheezing, rhonchi or rales.   Chest:      Chest wall: Tenderness present.   Abdominal:      General: Bowel sounds are normal.      Palpations: There is no mass.      Tenderness: There is abdominal tenderness. There is no guarding or rebound.         Results Reviewed       Procedure Component Value Units Date/Time    FLU/COVID Rapid Antigen (30 min. TAT) - Preferred screening test in ED [[id number]]  (Normal) Collected: 01/29/25  2120    Lab Status: Final result Specimen: Nares from Nose Updated: 01/29/25 2154     SARS COV Rapid Antigen Negative     Influenza A Rapid Antigen Negative     Influenza B Rapid Antigen Negative    Narrative:      This test has been performed using the [employer] Roxana 2 FLU+SARS Antigen test under the Emergency Use Authorization (EUA). This test has been validated by the  and verified by the performing laboratory. The Roxana uses lateral flow immunofluorescent sandwich assay to detect SARS-COV, Influenza A and Influenza B Antigen.     The [employer] Roxana 2 SARS Antigen test does not differentiate between SARS-CoV and SARS-CoV-2.     Negative results are presumptive and may be confirmed with a molecular assay, if necessary, for patient management. Negative results do not rule out SARS-CoV-2 or influenza infection and should not be used as the sole basis for treatment or patient management decisions. A negative test result may occur if the level of antigen in a sample is below the limit of detection of this test.     Positive results are indicative of the presence of viral antigens, but do not rule out bacterial infection or co-infection with other viruses.     All test results should be used as an adjunct to clinical observations and other information available to the provider.    FOR PEDIATRIC PATIENTS - copy/paste COVID Guidelines URL to browser: [email or web address]    HS Troponin 0hr (reflex protocol) [[id number]]  (Normal) Collected: 01/29/25 2120    Lab Status: Final result Specimen: Blood from Arm, Right Updated: 01/29/25 2152     hs TnI 0hr <2 ng/L     UA w Reflex to Microscopic w Reflex to Culture [[id number]]  (Normal) Collected: 01/29/25 2119    Lab Status: Final result Specimen: Urine, Other Updated: 01/29/25 2129     Color, UA Yellow     Clarity, UA Clear     Specific Gravity, UA 1.025     pH, UA 7.0     Leukocytes, UA Negative     Nitrite, UA  Negative     Protein, UA Negative mg/dl      Glucose, UA Negative mg/dl      Ketones, UA Negative mg/dl      Urobilinogen, UA 0.2 E.U./dl      Bilirubin, UA Negative     Occult Blood, UA Negative            XR chest 1 view portable    (Results Pending)       ECG 12 Lead Documentation Only    Date/Time: 1/29/2025 9:11 PM    Performed by: Juan Soliman MD  Authorized by: Juan Soliman MD    Patient location:  ED  Interpretation:     Interpretation: normal    Rate:     ECG rate:  86    ECG rate assessment: normal    Rhythm:     Rhythm: sinus rhythm        ED Medication and Procedure Management   Prior to Admission Medications   Prescriptions Last Dose Informant Patient Reported? Taking?   ferrous sulfate 324 (65 Fe) mg  Self No No   Sig: Take 1 tablet (324 mg total) by mouth 2 (two) times a day before meals   Patient not taking: Reported on 4/4/2024   lidocaine (Lidoderm) 5 %  Self No No   Sig: Apply 1 patch topically over 12 hours daily Remove & Discard patch within 12 hours or as directed by MD   Patient not taking: Reported on 4/4/2024      Facility-Administered Medications: None     Patient's Medications   Discharge Prescriptions    No medications on file       ED SEPSIS DOCUMENTATION   Time reflects when diagnosis was documented in both MDM as applicable and the Disposition within this note       Time User Action Codes Description Comment    1/29/2025 10:08 PM Juan Soliman Add [R07.9] Chest pain                  Juan Soliman MD  01/29/25 0250